# Patient Record
Sex: FEMALE | Race: BLACK OR AFRICAN AMERICAN | NOT HISPANIC OR LATINO | URBAN - METROPOLITAN AREA
[De-identification: names, ages, dates, MRNs, and addresses within clinical notes are randomized per-mention and may not be internally consistent; named-entity substitution may affect disease eponyms.]

---

## 2020-03-19 ENCOUNTER — INPATIENT (INPATIENT)
Facility: HOSPITAL | Age: 50
LOS: 14 days | Discharge: NURSING, REHAB OR LONG-TERM CARE | End: 2020-04-03
Attending: INTERNAL MEDICINE | Admitting: INTERNAL MEDICINE
Payer: MEDICARE

## 2020-03-19 VITALS
HEART RATE: 100 BPM | SYSTOLIC BLOOD PRESSURE: 92 MMHG | DIASTOLIC BLOOD PRESSURE: 39 MMHG | OXYGEN SATURATION: 97 % | TEMPERATURE: 100 F | RESPIRATION RATE: 18 BRPM

## 2020-03-19 DIAGNOSIS — R55 SYNCOPE AND COLLAPSE: ICD-10-CM

## 2020-03-19 DIAGNOSIS — B34.9 VIRAL INFECTION, UNSPECIFIED: ICD-10-CM

## 2020-03-19 DIAGNOSIS — E78.00 PURE HYPERCHOLESTEROLEMIA, UNSPECIFIED: ICD-10-CM

## 2020-03-19 DIAGNOSIS — E03.9 HYPOTHYROIDISM, UNSPECIFIED: ICD-10-CM

## 2020-03-19 DIAGNOSIS — Z02.9 ENCOUNTER FOR ADMINISTRATIVE EXAMINATIONS, UNSPECIFIED: ICD-10-CM

## 2020-03-19 DIAGNOSIS — I10 ESSENTIAL (PRIMARY) HYPERTENSION: ICD-10-CM

## 2020-03-19 DIAGNOSIS — Z29.9 ENCOUNTER FOR PROPHYLACTIC MEASURES, UNSPECIFIED: ICD-10-CM

## 2020-03-19 DIAGNOSIS — E11.9 TYPE 2 DIABETES MELLITUS WITHOUT COMPLICATIONS: ICD-10-CM

## 2020-03-19 DIAGNOSIS — Q90.9 DOWN SYNDROME, UNSPECIFIED: ICD-10-CM

## 2020-03-19 LAB
ALBUMIN SERPL ELPH-MCNC: 3.8 G/DL — SIGNIFICANT CHANGE UP (ref 3.3–5)
ALP SERPL-CCNC: 92 U/L — SIGNIFICANT CHANGE UP (ref 40–120)
ALT FLD-CCNC: 27 U/L — SIGNIFICANT CHANGE UP (ref 4–33)
ANION GAP SERPL CALC-SCNC: 13 MMO/L — SIGNIFICANT CHANGE UP (ref 7–14)
APPEARANCE UR: CLEAR — SIGNIFICANT CHANGE UP
APTT BLD: 34 SEC — SIGNIFICANT CHANGE UP (ref 27.5–36.3)
AST SERPL-CCNC: 34 U/L — HIGH (ref 4–32)
B PERT DNA SPEC QL NAA+PROBE: NOT DETECTED — SIGNIFICANT CHANGE UP
BACTERIA # UR AUTO: NEGATIVE — SIGNIFICANT CHANGE UP
BASE EXCESS BLDV CALC-SCNC: 1.2 MMOL/L — SIGNIFICANT CHANGE UP
BASOPHILS # BLD AUTO: 0.01 K/UL — SIGNIFICANT CHANGE UP (ref 0–0.2)
BASOPHILS NFR BLD AUTO: 0.2 % — SIGNIFICANT CHANGE UP (ref 0–2)
BILIRUB SERPL-MCNC: < 0.2 MG/DL — LOW (ref 0.2–1.2)
BILIRUB UR-MCNC: NEGATIVE — SIGNIFICANT CHANGE UP
BLOOD GAS VENOUS - CREATININE: 1.13 MG/DL — SIGNIFICANT CHANGE UP (ref 0.5–1.3)
BLOOD UR QL VISUAL: SIGNIFICANT CHANGE UP
BUN SERPL-MCNC: 16 MG/DL — SIGNIFICANT CHANGE UP (ref 7–23)
C PNEUM DNA SPEC QL NAA+PROBE: NOT DETECTED — SIGNIFICANT CHANGE UP
CALCIUM SERPL-MCNC: 8.3 MG/DL — LOW (ref 8.4–10.5)
CHLORIDE BLDV-SCNC: 105 MMOL/L — SIGNIFICANT CHANGE UP (ref 96–108)
CHLORIDE SERPL-SCNC: 103 MMOL/L — SIGNIFICANT CHANGE UP (ref 98–107)
CO2 SERPL-SCNC: 22 MMOL/L — SIGNIFICANT CHANGE UP (ref 22–31)
COLOR SPEC: YELLOW — SIGNIFICANT CHANGE UP
CREAT SERPL-MCNC: 1.2 MG/DL — SIGNIFICANT CHANGE UP (ref 0.5–1.3)
EOSINOPHIL # BLD AUTO: 0 K/UL — SIGNIFICANT CHANGE UP (ref 0–0.5)
EOSINOPHIL NFR BLD AUTO: 0 % — SIGNIFICANT CHANGE UP (ref 0–6)
FLUAV H1 2009 PAND RNA SPEC QL NAA+PROBE: NOT DETECTED — SIGNIFICANT CHANGE UP
FLUAV H1 RNA SPEC QL NAA+PROBE: NOT DETECTED — SIGNIFICANT CHANGE UP
FLUAV H3 RNA SPEC QL NAA+PROBE: NOT DETECTED — SIGNIFICANT CHANGE UP
FLUAV SUBTYP SPEC NAA+PROBE: NOT DETECTED — SIGNIFICANT CHANGE UP
FLUBV RNA SPEC QL NAA+PROBE: NOT DETECTED — SIGNIFICANT CHANGE UP
GAS PNL BLDV: 137 MMOL/L — SIGNIFICANT CHANGE UP (ref 136–146)
GLUCOSE BLDV-MCNC: 147 MG/DL — HIGH (ref 70–99)
GLUCOSE SERPL-MCNC: 151 MG/DL — HIGH (ref 70–99)
GLUCOSE UR-MCNC: NEGATIVE — SIGNIFICANT CHANGE UP
HADV DNA SPEC QL NAA+PROBE: NOT DETECTED — SIGNIFICANT CHANGE UP
HCG SERPL-ACNC: < 5 MIU/ML — SIGNIFICANT CHANGE UP
HCO3 BLDV-SCNC: 24 MMOL/L — SIGNIFICANT CHANGE UP (ref 20–27)
HCOV PNL SPEC NAA+PROBE: SIGNIFICANT CHANGE UP
HCT VFR BLD CALC: 39.9 % — SIGNIFICANT CHANGE UP (ref 34.5–45)
HCT VFR BLDV CALC: 42.1 % — SIGNIFICANT CHANGE UP (ref 34.5–45)
HGB BLD-MCNC: 13.4 G/DL — SIGNIFICANT CHANGE UP (ref 11.5–15.5)
HGB BLDV-MCNC: 13.7 G/DL — SIGNIFICANT CHANGE UP (ref 11.5–15.5)
HMPV RNA SPEC QL NAA+PROBE: NOT DETECTED — SIGNIFICANT CHANGE UP
HPIV1 RNA SPEC QL NAA+PROBE: NOT DETECTED — SIGNIFICANT CHANGE UP
HPIV2 RNA SPEC QL NAA+PROBE: NOT DETECTED — SIGNIFICANT CHANGE UP
HPIV3 RNA SPEC QL NAA+PROBE: NOT DETECTED — SIGNIFICANT CHANGE UP
HPIV4 RNA SPEC QL NAA+PROBE: NOT DETECTED — SIGNIFICANT CHANGE UP
HYALINE CASTS # UR AUTO: SIGNIFICANT CHANGE UP
IMM GRANULOCYTES NFR BLD AUTO: 0.8 % — SIGNIFICANT CHANGE UP (ref 0–1.5)
INR BLD: 1 — SIGNIFICANT CHANGE UP (ref 0.88–1.17)
KETONES UR-MCNC: NEGATIVE — SIGNIFICANT CHANGE UP
LACTATE BLDV-MCNC: 1.3 MMOL/L — SIGNIFICANT CHANGE UP (ref 0.5–2)
LEUKOCYTE ESTERASE UR-ACNC: SIGNIFICANT CHANGE UP
LYMPHOCYTES # BLD AUTO: 0.78 K/UL — LOW (ref 1–3.3)
LYMPHOCYTES # BLD AUTO: 15.4 % — SIGNIFICANT CHANGE UP (ref 13–44)
MCHC RBC-ENTMCNC: 30.9 PG — SIGNIFICANT CHANGE UP (ref 27–34)
MCHC RBC-ENTMCNC: 33.6 % — SIGNIFICANT CHANGE UP (ref 32–36)
MCV RBC AUTO: 92.1 FL — SIGNIFICANT CHANGE UP (ref 80–100)
MONOCYTES # BLD AUTO: 0.45 K/UL — SIGNIFICANT CHANGE UP (ref 0–0.9)
MONOCYTES NFR BLD AUTO: 8.9 % — SIGNIFICANT CHANGE UP (ref 2–14)
NEUTROPHILS # BLD AUTO: 3.8 K/UL — SIGNIFICANT CHANGE UP (ref 1.8–7.4)
NEUTROPHILS NFR BLD AUTO: 74.7 % — SIGNIFICANT CHANGE UP (ref 43–77)
NITRITE UR-MCNC: NEGATIVE — SIGNIFICANT CHANGE UP
NRBC # FLD: 0 K/UL — SIGNIFICANT CHANGE UP (ref 0–0)
PCO2 BLDV: 46 MMHG — SIGNIFICANT CHANGE UP (ref 41–51)
PH BLDV: 7.37 PH — SIGNIFICANT CHANGE UP (ref 7.32–7.43)
PH UR: 6.5 — SIGNIFICANT CHANGE UP (ref 5–8)
PLATELET # BLD AUTO: 188 K/UL — SIGNIFICANT CHANGE UP (ref 150–400)
PMV BLD: 9.5 FL — SIGNIFICANT CHANGE UP (ref 7–13)
PO2 BLDV: 35 MMHG — SIGNIFICANT CHANGE UP (ref 35–40)
POTASSIUM BLDV-SCNC: 4.5 MMOL/L — SIGNIFICANT CHANGE UP (ref 3.4–4.5)
POTASSIUM SERPL-MCNC: 4.8 MMOL/L — SIGNIFICANT CHANGE UP (ref 3.5–5.3)
POTASSIUM SERPL-SCNC: 4.8 MMOL/L — SIGNIFICANT CHANGE UP (ref 3.5–5.3)
PROT SERPL-MCNC: 7 G/DL — SIGNIFICANT CHANGE UP (ref 6–8.3)
PROT UR-MCNC: 30 — SIGNIFICANT CHANGE UP
PROTHROM AB SERPL-ACNC: 11.4 SEC — SIGNIFICANT CHANGE UP (ref 9.8–13.1)
RBC # BLD: 4.33 M/UL — SIGNIFICANT CHANGE UP (ref 3.8–5.2)
RBC # FLD: 14.3 % — SIGNIFICANT CHANGE UP (ref 10.3–14.5)
RBC CASTS # UR COMP ASSIST: HIGH (ref 0–?)
RSV RNA SPEC QL NAA+PROBE: NOT DETECTED — SIGNIFICANT CHANGE UP
RV+EV RNA SPEC QL NAA+PROBE: NOT DETECTED — SIGNIFICANT CHANGE UP
SAO2 % BLDV: 61.3 % — SIGNIFICANT CHANGE UP (ref 60–85)
SODIUM SERPL-SCNC: 138 MMOL/L — SIGNIFICANT CHANGE UP (ref 135–145)
SP GR SPEC: 1.02 — SIGNIFICANT CHANGE UP (ref 1–1.04)
SQUAMOUS # UR AUTO: SIGNIFICANT CHANGE UP
UROBILINOGEN FLD QL: NORMAL — SIGNIFICANT CHANGE UP
WBC # BLD: 5.08 K/UL — SIGNIFICANT CHANGE UP (ref 3.8–10.5)
WBC # FLD AUTO: 5.08 K/UL — SIGNIFICANT CHANGE UP (ref 3.8–10.5)
WBC UR QL: SIGNIFICANT CHANGE UP (ref 0–?)

## 2020-03-19 PROCEDURE — 99223 1ST HOSP IP/OBS HIGH 75: CPT

## 2020-03-19 PROCEDURE — 71045 X-RAY EXAM CHEST 1 VIEW: CPT | Mod: 26

## 2020-03-19 RX ORDER — INSULIN LISPRO 100/ML
VIAL (ML) SUBCUTANEOUS
Refills: 0 | Status: DISCONTINUED | OUTPATIENT
Start: 2020-03-19 | End: 2020-04-03

## 2020-03-19 RX ORDER — LEVOTHYROXINE SODIUM 125 MCG
1 TABLET ORAL
Qty: 0 | Refills: 0 | DISCHARGE

## 2020-03-19 RX ORDER — DEXTROSE 50 % IN WATER 50 %
12.5 SYRINGE (ML) INTRAVENOUS ONCE
Refills: 0 | Status: DISCONTINUED | OUTPATIENT
Start: 2020-03-19 | End: 2020-04-03

## 2020-03-19 RX ORDER — DEXTROSE 50 % IN WATER 50 %
25 SYRINGE (ML) INTRAVENOUS ONCE
Refills: 0 | Status: DISCONTINUED | OUTPATIENT
Start: 2020-03-19 | End: 2020-04-03

## 2020-03-19 RX ORDER — SODIUM CHLORIDE 9 MG/ML
1000 INJECTION, SOLUTION INTRAVENOUS
Refills: 0 | Status: DISCONTINUED | OUTPATIENT
Start: 2020-03-19 | End: 2020-04-03

## 2020-03-19 RX ORDER — ACETAMINOPHEN 500 MG
650 TABLET ORAL ONCE
Refills: 0 | Status: COMPLETED | OUTPATIENT
Start: 2020-03-19 | End: 2020-03-19

## 2020-03-19 RX ORDER — DEXTROSE 50 % IN WATER 50 %
15 SYRINGE (ML) INTRAVENOUS ONCE
Refills: 0 | Status: DISCONTINUED | OUTPATIENT
Start: 2020-03-19 | End: 2020-04-03

## 2020-03-19 RX ORDER — LEVOTHYROXINE SODIUM 125 MCG
25 TABLET ORAL DAILY
Refills: 0 | Status: DISCONTINUED | OUTPATIENT
Start: 2020-03-19 | End: 2020-04-03

## 2020-03-19 RX ORDER — HEPARIN SODIUM 5000 [USP'U]/ML
5000 INJECTION INTRAVENOUS; SUBCUTANEOUS THREE TIMES A DAY
Refills: 0 | Status: DISCONTINUED | OUTPATIENT
Start: 2020-03-19 | End: 2020-04-03

## 2020-03-19 RX ORDER — PANTOPRAZOLE SODIUM 20 MG/1
40 TABLET, DELAYED RELEASE ORAL
Refills: 0 | Status: DISCONTINUED | OUTPATIENT
Start: 2020-03-19 | End: 2020-04-03

## 2020-03-19 RX ORDER — METFORMIN HYDROCHLORIDE 850 MG/1
1 TABLET ORAL
Qty: 0 | Refills: 0 | DISCHARGE

## 2020-03-19 RX ORDER — INSULIN LISPRO 100/ML
VIAL (ML) SUBCUTANEOUS AT BEDTIME
Refills: 0 | Status: DISCONTINUED | OUTPATIENT
Start: 2020-03-19 | End: 2020-04-03

## 2020-03-19 RX ORDER — OMEPRAZOLE 10 MG/1
1 CAPSULE, DELAYED RELEASE ORAL
Qty: 0 | Refills: 0 | DISCHARGE

## 2020-03-19 RX ORDER — GLUCAGON INJECTION, SOLUTION 0.5 MG/.1ML
1 INJECTION, SOLUTION SUBCUTANEOUS ONCE
Refills: 0 | Status: DISCONTINUED | OUTPATIENT
Start: 2020-03-19 | End: 2020-04-03

## 2020-03-19 RX ADMIN — Medication 650 MILLIGRAM(S): at 20:54

## 2020-03-19 NOTE — H&P ADULT - PROBLEM SELECTOR PLAN 6
- not on home medication  - monitor BP   - DASH/TLC diet - TSH AM   - continue home medications  - monitor - TSH AM   - Continue levothyroxine

## 2020-03-19 NOTE — H&P ADULT - PROBLEM SELECTOR PLAN 2
- r/o COVID-19 viral syndrome  - Chest Xray: impression: clear lungs  - Full RVP Panel   neg  - continue O2 NC, may use NRB  - AVOID HFNC / Bipap /Nebulizers  - Contact, airborne, droplet isolation precautions  - Monitor respiratory status closely  - Patient is at a High / Low risk of decompensation at this time. - r/o COVID-19 viral syndrome  - Chest Xray: impression: clear lungs  - CT chest ordered  - will hold of abx for CAP at this time , will follow up CT chest  - Full RVP Panel   neg  - continue O2 NC, may use NRB  - AVOID HFNC / Bipap /Nebulizers  - Contact, airborne, droplet isolation precautions  - Monitor respiratory status closely  - Patient is at a Low risk of decompensation at this time. Pt with unexplained fever here.  - R/o COVID-19 viral syndrome  - Chest Xray: impression: clear lungs  - CT chest ordered  - Will hold off on abx for CAP at this time, will follow up CT chest  - Full RVP negative  - Continue O2 NC, may use NRB.   - AVOID HFNC/Bipap/Nebulizers  - Contact, airborne, droplet isolation precautions  - Monitor respiratory status closely

## 2020-03-19 NOTE — ED ADULT NURSE NOTE - OBJECTIVE STATEMENT
Patient received to room 5, non-verbal, respirations even and unlabored, skin intact, skin warm and dry good for color, calm and cooperative, guardian at bedside (Sha Nicole tel [phone]/cell [phone]). As per guardian, patient had two syncopal episodes, one yesterday and once today, aid denies falls. As per guardian, patient is currently at baseline. Left Ac 20g IV placed, labs drawn and sent, sinus rhythm on cardiac monitor. Pending provider assessment. Hx down syndrome.

## 2020-03-19 NOTE — ED ADULT NURSE NOTE - ED STAT RN HANDOFF DETAILS
rpt given to 5 Cecil RN pt VSS pt is at baseline. pt remains on airborne and contact precautions. pt placed in for transport. Will continue to monitor the pt

## 2020-03-19 NOTE — ED ADULT TRIAGE NOTE - CHIEF COMPLAINT QUOTE
p/t with hx Down syndrome, brought for syncopal episode yesterday and this am, p/t baseline nonverbal nad noted

## 2020-03-19 NOTE — H&P ADULT - NSICDXPASTMEDICALHX_GEN_ALL_CORE_FT
PAST MEDICAL HISTORY:  Down syndrome     High cholesterol     Hypertension PAST MEDICAL HISTORY:  Diabetes mellitus, type 2     Down syndrome     High cholesterol     Hypertension

## 2020-03-19 NOTE — ED PROVIDER NOTE - CLINICAL SUMMARY MEDICAL DECISION MAKING FREE TEXT BOX
syncope, low grade temp present. plan for ekg, trop, labs, CXR, u/a, RVP. Likely d/c home with quarantine precautions as long patient remains stable here in ED. syncope, low grade temp present. plan for ekg, trop, labs, CXR, u/a, RVP. Dispo depends on results and ability to quarantine at home.

## 2020-03-19 NOTE — H&P ADULT - NSHPSOCIALHISTORY_GEN_ALL_CORE
Tobacco Usage:  (x ) never smoked   ( ) former smoker  ( ) current smoker; Packs per day:   Alcohol Usage: (x ) none  ( ) occasional ( ) 2-3 times a week ( ) daily; Last drink:   Recreational drugs (x ) None  Lives with home with caregiver  Denies Recent travel Tobacco Usage:  (x ) never smoked   ( ) former smoker  ( ) current smoker; Packs per day:   Alcohol Usage: (x ) none  ( ) occasional ( ) 2-3 times a week ( ) daily; Last drink:   Recreational drugs (x ) None  Lives with home with caregiver

## 2020-03-19 NOTE — H&P ADULT - NSHPREVIEWOFSYSTEMS_GEN_ALL_CORE
Unable to obtain review of systems Pt is non verbal at baseline. Unable to obtain review of systems, as pt nonverbal. Pt is non verbal at baseline.

## 2020-03-19 NOTE — H&P ADULT - PROBLEM SELECTOR PLAN 4
- continue monitoring - Hold oral DM meds while inpatient   - A1C AM  - ISS  - monitor FS  - diabetic diet - Hold oral DM meds while inpatient   - A1C AM  - Start ISS and FS checks qAC and qhs  - monitor FS  - diabetic diet

## 2020-03-19 NOTE — H&P ADULT - ATTENDING COMMENTS
49F with history of Down's Syndrome, HTN, hyperlipidemia, Hypothyroidism, DM2 (on metformin) presents with syncope x2, possibly in setting of active infection. Temp to 100.3F, r/o COVID-19.

## 2020-03-19 NOTE — H&P ADULT - ASSESSMENT
Pt is being admitted for syncope work up and r/o COVID 19 48 y/o Female w/ PMHx of Down's Syndrome, HTN, hyperlipidemia, Hypothyroidism, DM2 ( on metformin) presents with Syncope x2. Pt is being admitted for syncope work up and r/o COVID 19 50 y/o Female w/ PMHx of Down's Syndrome, HTN, hyperlipidemia, Hypothyroidism, DM2 ( on metformin) presents with Syncope x2. Pt is being admitted for syncope work up and r/o COVID 19.

## 2020-03-19 NOTE — ED ADULT NURSE NOTE - NSIMPLEMENTINTERV_GEN_ALL_ED
Implemented All Universal Safety Interventions:  [place] to call system. Call bell, personal items and telephone within reach. Instruct patient to call for assistance. Room bathroom lighting operational. Non-slip footwear when patient is off stretcher. Physically safe environment: no spills, clutter or unnecessary equipment. Stretcher in lowest position, wheels locked, appropriate side rails in place.

## 2020-03-19 NOTE — H&P ADULT - PROBLEM SELECTOR PLAN 7
- lipid profile   - Low fat/ low cholesterol diet - not on home medication  - monitor BP   - DASH/TLC diet - Not on home medication  - Monitor BP   - DASH/TLC diet

## 2020-03-19 NOTE — H&P ADULT - PROBLEM SELECTOR PLAN 3
- Hold oral DM meds while inpatient   - A1C AM  - ISS  - monitor FS  - diabetic diet - Leuk Esterase: MODERATE   - Blood cultures and urine cultures sent  - will hold off on antibiotics   - monitor - Leuk Esterase: MODERATE   - Blood cultures and urine cultures sent  - will hold off on abx for now pending CT chest and culture results  - monitor

## 2020-03-19 NOTE — H&P ADULT - HISTORY OF PRESENT ILLNESS
In progress 50 y/o Female w/ PMHx of Down's Syndrome, HTN, hyperlipidemia, Hypothyroidism, DM2 ( on metformin) presents with Syncope x2. Patient is non-verbal and caretaker is providing all history. Spoke with care giver over the phone. She states post bowel movement pt passed out multiple times, no seizure activity noted by care giver and no head trauma.  Pt is non verbal and unable to gain review of systems. Per care giver pt lives with her and dines travel or sick contact. 48 y/o Female w/ PMHx of Down's Syndrome, HTN, hyperlipidemia, Hypothyroidism, DM2 ( on metformin) presents with Syncope x2. Patient is nonverbal and caretaker is providing all history. Spoke with caretaker over the phone. She states post bowel movement, pt passed out multiple times, no seizure activity noted by caretaker and no head trauma.  Pt is nonverbal and unable to obtain review of systems. Per caretaker, pt lives with her and defines travel or sick contacts.

## 2020-03-19 NOTE — H&P ADULT - PROBLEM SELECTOR PLAN 1
- TTE AM  - orthostatics   - monitor on tele   - fall precautions  - ambulate w/ assistance   - PT eliudal Unclear etiology, possibly in setting of active etiology, but can be orthostatic vs. vasovagal vs. neurogenic vs. cardiogenic.  - TTE ordered  - Check orthostatics   - Monitor on tele for arrhythmias  - Fall precautions  - Ambulate w/ assistance     - CT head and neck, Xrays negative

## 2020-03-19 NOTE — H&P ADULT - PROBLEM SELECTOR PLAN 5
- TSH AM   - continue home medications  - monitor - continue monitoring - Aspiration precautions  - Fall risk protocol

## 2020-03-19 NOTE — ED ADULT NURSE REASSESSMENT NOTE - NS ED NURSE REASSESS COMMENT FT1
Received report from day shift RN Abigail. Received Pt in room 5. pt nonverbal unable to answer questions, pt with down syndrome. Appears to be comfortable, stable and in no apparent distress. pt turned, cleaned and positioned. No complaints noted. Vitals stable as noted. 20 gauge iv noted to the left ac. Pt medicated as per orders. respirations are equal and nonlabored, no respiratory distress noted. sating 100% on room air. Pt stable, will reassess

## 2020-03-19 NOTE — H&P ADULT - NSHPLABSRESULTS_GEN_ALL_CORE
LABORATORY VALUES	 	                          13.4   5.08  )-----------( 188      ( 19 Mar 2020 11:37 )             39.9           138  |  103  |  16  ----------------------------<  151<H>  4.8   |  22  |  1.20    Ca    8.3<L>      19 Mar 2020 11:37    TPro  7.0  /  Alb  3.8  /  TBili  < 0.2<L>  /  DBili  x   /  AST  34<H>  /  ALT  27  /  AlkPhos  92      LIVER FUNCTIONS - ( 19 Mar 2020 11:37 )  Alb: 3.8 g/dL / Pro: 7.0 g/dL / ALK PHOS: 92 u/L / ALT: 27 u/L / AST: 34 u/L / GGT: x           Prothrombin Time, Plasma: 11.4 SEC ( @ 11:37)      CARDIAC MARKERS:    Blood Gas Venous - Lactate: 1.3 mmol/L ( @ 11:37)    Urinalysis Basic - ( 19 Mar 2020 17:15 )    Color: YELLOW / Appearance: CLEAR / S.025 / pH: 6.5  Gluc: NEGATIVE / Ketone: NEGATIVE  / Bili: NEGATIVE / Urobili: NORMAL   Blood: TRACE / Protein: 30 / Nitrite: NEGATIVE   Leuk Esterase: MODERATE / RBC: 6-10 / WBC 0-2   Sq Epi: FEW / Non Sq Epi: x / Bacteria: NEGATIVE      CAPILLARY BLOOD GLUCOSE     @ 13:30  229E Corona Virus --  Adenovirus Not Detected  Bordetella pertussis --  Chlamydia pneumoniae Not Detected  Entero/Rhino Virus Not Detected  HKU1 Coronavirus --  hMPV Not Detected  Influenza A Not Detected  Influenza AH1 Not Detected  Influenza AH1  Not Detected  Influenza AH3 Not Detected  Influenza B Not Detected  Mycoplasma pneumoniae Not Detected  NL63 Coronavirus --  OC43 Corornavirus --  Parainfluenza 1 Not Detected  Parainfluenza 2 Not Detected    Chest Xray :  IMPRESSION: No acute process. LABORATORY VALUES	 	                        13.4   5.08  )-----------( 188      ( 19 Mar 2020 11:37 )             39.9           138  |  103  |  16  ----------------------------<  151<H>  4.8   |  22  |  1.20    Ca    8.3<L>      19 Mar 2020 11:37    TPro  7.0  /  Alb  3.8  /  TBili  < 0.2<L>  /  DBili  x   /  AST  34<H>  /  ALT  27  /  AlkPhos  92      LIVER FUNCTIONS - ( 19 Mar 2020 11:37 )  Alb: 3.8 g/dL / Pro: 7.0 g/dL / ALK PHOS: 92 u/L / ALT: 27 u/L / AST: 34 u/L / GGT: x           Prothrombin Time, Plasma: 11.4 SEC ( @ 11:37)    CARDIAC MARKERS:    Blood Gas Venous - Lactate: 1.3 mmol/L ( @ 11:37)    Urinalysis Basic - ( 19 Mar 2020 17:15 )    Color: YELLOW / Appearance: CLEAR / S.025 / pH: 6.5  Gluc: NEGATIVE / Ketone: NEGATIVE  / Bili: NEGATIVE / Urobili: NORMAL   Blood: TRACE / Protein: 30 / Nitrite: NEGATIVE   Leuk Esterase: MODERATE / RBC: 6-10 / WBC 0-2   Sq Epi: FEW / Non Sq Epi: x / Bacteria: NEGATIVE    CAPILLARY BLOOD GLUCOSE     @ 13:30  229E Corona Virus --  Adenovirus Not Detected  Bordetella pertussis --  Chlamydia pneumoniae Not Detected  Entero/Rhino Virus Not Detected  HKU1 Coronavirus --  hMPV Not Detected  Influenza A Not Detected  Influenza AH1 Not Detected  Influenza AH1  Not Detected  Influenza AH3 Not Detected  Influenza B Not Detected  Mycoplasma pneumoniae Not Detected  NL63 Coronavirus --  OC43 Corornavirus --  Parainfluenza 1 Not Detected  Parainfluenza 2 Not Detected    Chest Xray :  IMPRESSION: No acute process.

## 2020-03-19 NOTE — H&P ADULT - NSHPPHYSICALEXAM_GEN_ALL_CORE
T(C): 37.9 (03-19-20 @ 19:24), Max: 37.9 (03-19-20 @ 19:24)  HR: 88 (03-19-20 @ 19:24) (85 - 100)  BP: 115/66 (03-19-20 @ 19:24) (92/39 - 115/66)  RR: 16 (03-19-20 @ 19:24) (16 - 18)  SpO2: 99% (03-19-20 @ 19:24) (95% - 99%)    Constitutional: NAD, well-developed, well-nourished  Ears, Nose, Mouth, and Throat: normal external ears and nose, normal hearing, moist oral mucosa  Eyes: normal conjunctiva, EOMI, PERRL  Neck: supple, no JVD  Respiratory: Clear to auscultation bilaterally. No wheezes, rales or rhonchi. Normal respiratory effort  Cardiovascular: RRR, no M/R/G, no edema, 2+ Peripheral Pulses  Gastrointestinal: soft, nontender, nondistended, +BS, no hernia  Skin: warm, dry, no rash  Neurologic: sensation grossly intact, CN grossly intact, non-focal exam  Musculoskeletal: no clubbing, no cyanosis, no joint swelling  Psychiatric: AOX3, appropriate mood, affect T(C): 37.9 (03-19-20 @ 19:24), Max: 37.9 (03-19-20 @ 19:24)  HR: 88 (03-19-20 @ 19:24) (85 - 100)  BP: 115/66 (03-19-20 @ 19:24) (92/39 - 115/66)  RR: 16 (03-19-20 @ 19:24) (16 - 18)  SpO2: 99% (03-19-20 @ 19:24) (95% - 99%)    Physical Exam limited given pt's inability to understand and follow directions.   Constitutional: Awake and alert, NAD  Head: Normocephalic, atraumatic  Eyes: Normal conjunctiva, EOMI  Mouth: Omitted  Neck: Supple, no JVD  Respiratory: Clear to auscultation bilaterally. No wheezes, rales or rhonchi. Normal respiratory effort.  Cardiovascular: RRR, normal S1 and S2, no M/R/G, no LE edema b/l, 2+ Peripheral Pulses  Gastrointestinal: Soft, nontender, nondistended, +BS, no hernia  Skin: Warm, dry, no rashes  Neurologic: Nonverbal, limited exam  Musculoskeletal: No cyanosis, no paraspinal or spinal tenderness

## 2020-03-19 NOTE — ED PROVIDER NOTE - PROGRESS NOTE DETAILS
Attending MD Baltazar.  Pt signed out to me in hemodynamically stable condition pending urine and dispo decision, pt lives in a home with care taker, has down's syndrome, presented today because of 2 episodes post-micturitional syncope, no travel/sick contacts, no cough/congestion noted, planned 14 day quarantine for her and housemates if discharged, caretaker aware. Attending MD Baltazar.  Ct Amado has down syndrome, fever and syncope today.  Concern for poss cardiac etiology of her syncope 2/2 down syndrome risk, also poss viral cardiomyopathy.  Pt warrants admission for echocardiogram.  Currently pt's presentation does not warrant admission for concern for covid/severity of viral syndrome.  She does however warrant echocardiogram prior to discharge.  Will send covid testing for in-hospital cohorting.  Stable for admission to medicine.

## 2020-03-19 NOTE — ED PROVIDER NOTE - OBJECTIVE STATEMENT
Daksha GUZMÁN: Patient is a 50 yo F with history of Down's Syndrome, HTN, hyperlipidemia brought in for episode of syncope x 2, both while on toilet during straining. One episode occurred yesterday and one today. No prior episodes. Patient is a [facility] of the [facility] but resides in a facility backed home with caretaker present. He denies travel, sick contacts, prior episodes. Patient is non-verbal and caretaker is providing all history. No recent hospitalizations.

## 2020-03-20 DIAGNOSIS — N39.0 URINARY TRACT INFECTION, SITE NOT SPECIFIED: ICD-10-CM

## 2020-03-20 LAB
ANION GAP SERPL CALC-SCNC: 12 MMO/L — SIGNIFICANT CHANGE UP (ref 7–14)
BUN SERPL-MCNC: 15 MG/DL — SIGNIFICANT CHANGE UP (ref 7–23)
CALCIUM SERPL-MCNC: 8.2 MG/DL — LOW (ref 8.4–10.5)
CHLORIDE SERPL-SCNC: 101 MMOL/L — SIGNIFICANT CHANGE UP (ref 98–107)
CHOLEST SERPL-MCNC: 140 MG/DL — SIGNIFICANT CHANGE UP (ref 120–199)
CO2 SERPL-SCNC: 24 MMOL/L — SIGNIFICANT CHANGE UP (ref 22–31)
CREAT SERPL-MCNC: 1 MG/DL — SIGNIFICANT CHANGE UP (ref 0.5–1.3)
CULTURE RESULTS: SIGNIFICANT CHANGE UP
GLUCOSE SERPL-MCNC: 128 MG/DL — HIGH (ref 70–99)
HBA1C BLD-MCNC: 6.7 % — HIGH (ref 4–5.6)
HCT VFR BLD CALC: 41.9 % — SIGNIFICANT CHANGE UP (ref 34.5–45)
HDLC SERPL-MCNC: 47 MG/DL — SIGNIFICANT CHANGE UP (ref 45–65)
HGB BLD-MCNC: 13.5 G/DL — SIGNIFICANT CHANGE UP (ref 11.5–15.5)
LIPID PNL WITH DIRECT LDL SERPL: 83 MG/DL — SIGNIFICANT CHANGE UP
MAGNESIUM SERPL-MCNC: 2.1 MG/DL — SIGNIFICANT CHANGE UP (ref 1.6–2.6)
MCHC RBC-ENTMCNC: 30 PG — SIGNIFICANT CHANGE UP (ref 27–34)
MCHC RBC-ENTMCNC: 32.2 % — SIGNIFICANT CHANGE UP (ref 32–36)
MCV RBC AUTO: 93.1 FL — SIGNIFICANT CHANGE UP (ref 80–100)
NRBC # FLD: 0 K/UL — SIGNIFICANT CHANGE UP (ref 0–0)
PHOSPHATE SERPL-MCNC: 3.6 MG/DL — SIGNIFICANT CHANGE UP (ref 2.5–4.5)
PLATELET # BLD AUTO: 168 K/UL — SIGNIFICANT CHANGE UP (ref 150–400)
PMV BLD: 9 FL — SIGNIFICANT CHANGE UP (ref 7–13)
POTASSIUM SERPL-MCNC: 4.5 MMOL/L — SIGNIFICANT CHANGE UP (ref 3.5–5.3)
POTASSIUM SERPL-SCNC: 4.5 MMOL/L — SIGNIFICANT CHANGE UP (ref 3.5–5.3)
RBC # BLD: 4.5 M/UL — SIGNIFICANT CHANGE UP (ref 3.8–5.2)
RBC # FLD: 14.4 % — SIGNIFICANT CHANGE UP (ref 10.3–14.5)
SODIUM SERPL-SCNC: 137 MMOL/L — SIGNIFICANT CHANGE UP (ref 135–145)
SPECIMEN SOURCE: SIGNIFICANT CHANGE UP
TRIGL SERPL-MCNC: 129 MG/DL — SIGNIFICANT CHANGE UP (ref 10–149)
TSH SERPL-MCNC: 5.75 UIU/ML — HIGH (ref 0.27–4.2)
WBC # BLD: 3.77 K/UL — LOW (ref 3.8–10.5)
WBC # FLD AUTO: 3.77 K/UL — LOW (ref 3.8–10.5)

## 2020-03-20 PROCEDURE — 99233 SBSQ HOSP IP/OBS HIGH 50: CPT

## 2020-03-20 RX ORDER — SODIUM CHLORIDE 9 MG/ML
1000 INJECTION, SOLUTION INTRAVENOUS
Refills: 0 | Status: COMPLETED | OUTPATIENT
Start: 2020-03-20 | End: 2020-03-20

## 2020-03-20 RX ORDER — ACETAMINOPHEN 500 MG
650 TABLET ORAL EVERY 6 HOURS
Refills: 0 | Status: DISCONTINUED | OUTPATIENT
Start: 2020-03-20 | End: 2020-04-03

## 2020-03-20 RX ADMIN — SODIUM CHLORIDE 75 MILLILITER(S): 9 INJECTION, SOLUTION INTRAVENOUS at 16:09

## 2020-03-20 RX ADMIN — Medication 25 MICROGRAM(S): at 05:02

## 2020-03-20 RX ADMIN — PANTOPRAZOLE SODIUM 40 MILLIGRAM(S): 20 TABLET, DELAYED RELEASE ORAL at 05:02

## 2020-03-20 RX ADMIN — HEPARIN SODIUM 5000 UNIT(S): 5000 INJECTION INTRAVENOUS; SUBCUTANEOUS at 14:34

## 2020-03-20 RX ADMIN — Medication 1: at 12:40

## 2020-03-20 NOTE — PROGRESS NOTE ADULT - SUBJECTIVE AND OBJECTIVE BOX
Patient is a 49y old  Female who presents with a chief complaint of Syncope (19 Mar 2020 21:33)    SUBJECTIVE / OVERNIGHT EVENTS: Patient is non verbal and unable to provide history. Appears well. Not in distress. Eating lunch.     MEDICATIONS  (STANDING):  dextrose 5%. 1000 milliLiter(s) (50 mL/Hr) IV Continuous <Continuous>  dextrose 50% Injectable 12.5 Gram(s) IV Push once  dextrose 50% Injectable 25 Gram(s) IV Push once  dextrose 50% Injectable 25 Gram(s) IV Push once  heparin  Injectable 5000 Unit(s) SubCutaneous three times a day  insulin lispro (HumaLOG) corrective regimen sliding scale   SubCutaneous three times a day before meals  insulin lispro (HumaLOG) corrective regimen sliding scale   SubCutaneous at bedtime  levothyroxine 25 MICROGram(s) Oral daily  pantoprazole    Tablet 40 milliGRAM(s) Oral before breakfast    MEDICATIONS  (PRN):  acetaminophen   Tablet .. 650 milliGRAM(s) Oral every 6 hours PRN Temp greater or equal to 38C (100.4F)  dextrose 40% Gel 15 Gram(s) Oral once PRN Blood Glucose LESS THAN 70 milliGRAM(s)/deciliter  glucagon  Injectable 1 milliGRAM(s) IntraMuscular once PRN Glucose LESS THAN 70 milligrams/deciliter      Vital Signs Last 24 Hrs  T(C): 37.5 (20 Mar 2020 12:20), Max: 37.9 (19 Mar 2020 19:24)  T(F): 99.5 (20 Mar 2020 12:20), Max: 100.3 (19 Mar 2020 19:24)  HR: 93 (20 Mar 2020 12:20) (84 - 93)  BP: 110/51 (20 Mar 2020 12:20) (90/54 - 124/70)  BP(mean): --  RR: 17 (20 Mar 2020 12:20) (16 - 18)  SpO2: 100% (20 Mar 2020 12:20) (99% - 100%)    POCT Blood Glucose.: 190 mg/dL (20 Mar 2020 12:24)  POCT Blood Glucose.: 122 mg/dL (20 Mar 2020 08:38)    I&O's Summary    20 Mar 2020 07:01  -  20 Mar 2020 12:57  --------------------------------------------------------  IN: 120 mL / OUT: 0 mL / NET: 120 mL        PHYSICAL EXAM:  GENERAL: NAD, well-developed  HEAD:  Atraumatic, Normocephalic  EYES: EOMI, PERRLA, conjunctiva and sclera clear  NECK: Supple, No JVD  CHEST/LUNG: Clear to auscultation bilaterally; No wheeze  HEART: Regular rate and rhythm; No murmurs, rubs, or gallops  ABDOMEN: Soft, Nontender, Nondistended; Bowel sounds present  EXTREMITIES:  2+ Peripheral Pulses, No clubbing, cyanosis, or edema  PSYCH: awake  NEUROLOGY: non-focal  SKIN: No rashes or lesions    LABS:                        13.5   3.77  )-----------( 168      ( 20 Mar 2020 06:00 )             41.9     03-20    137  |  101  |  15  ----------------------------<  128<H>  4.5   |  24  |  1.00    Ca    8.2<L>      20 Mar 2020 06:00  Phos  3.6     03-20  Mg     2.1     03-20    TPro  7.0  /  Alb  3.8  /  TBili  < 0.2<L>  /  DBili  x   /  AST  34<H>  /  ALT  27  /  AlkPhos  92  03-19    PT/INR - ( 19 Mar 2020 11:37 )   PT: 11.4 SEC;   INR: 1.00          PTT - ( 19 Mar 2020 11:37 )  PTT:34.0 SEC      Urinalysis Basic - ( 19 Mar 2020 17:15 )    Color: YELLOW / Appearance: CLEAR / S.025 / pH: 6.5  Gluc: NEGATIVE / Ketone: NEGATIVE  / Bili: NEGATIVE / Urobili: NORMAL   Blood: TRACE / Protein: 30 / Nitrite: NEGATIVE   Leuk Esterase: MODERATE / RBC: 6-10 / WBC 0-2   Sq Epi: FEW / Non Sq Epi: x / Bacteria: NEGATIVE        RADIOLOGY & ADDITIONAL TESTS:     Imaging Personally Reviewed:CXR: clear lungs     Consultant(s) Notes Reviewed:      Care Discussed with Consultants/Other Providers:    Assessment and Plan:

## 2020-03-20 NOTE — PATIENT PROFILE ADULT - CAREGIVER OBTAIN DETAILS
Telephone Encounter by Peabody, Diane, RN at 17 12:02 PM     Author:  Peabody, Diane, RN Service:  (none) Author Type:  Registered Nurse     Filed:  17 12:02 PM Encounter Date:  2017 Status:  Signed     :  Peabody, Diane, RN (Registered Nurse)       From: Maura Simpson  To: Essence Novoa MD  Sent: 2017 11:14 AM CDT  Subject: After-Visit Questions    This message is for Bianca with lactation. I'm having some issues   breastfeeding my , and I was wondering if we could come in to get   some tips on a successful latch. I have to bring baby into the [facility]   office today at 2:15 for a weight check, so I was hoping you had something   available before/after that? Please let me know. Thanks! Maura       Revision History        Date/Time User Provider Type Action    > 17 12:02 PM Peabody, Diane, RN Registered Nurse Sign    Attribution information within the note text is not available.             NYDIA - patient nonverbal

## 2020-03-20 NOTE — PROGRESS NOTE ADULT - PROBLEM SELECTOR PLAN 1
-History of syncope post BM suggestive of vasovagal episode  -No further episodes where  -can be orthostatic vs. vasovagal and less likely neurogenic or cardiogenic.  -Check orthostatic  - TTE ordered  - Monitor on tele for arrhythmias  - Fall precautions  - Ambulate w/ assistance   - CT head and neck, Xrays negative -History of syncope post BM suggestive of vasovagal episode  -No further episodes where  -can be orthostatic vs. vasovagal and less likely neurogenic or cardiogenic.  -Check orthostatic  - TTE ordered  - Monitor on tele for arrhythmias  - Fall precautions  - Ambulate w/ assistance

## 2020-03-21 LAB
ANION GAP SERPL CALC-SCNC: 10 MMO/L — SIGNIFICANT CHANGE UP (ref 7–14)
BUN SERPL-MCNC: 16 MG/DL — SIGNIFICANT CHANGE UP (ref 7–23)
CALCIUM SERPL-MCNC: 8.1 MG/DL — LOW (ref 8.4–10.5)
CHLORIDE SERPL-SCNC: 101 MMOL/L — SIGNIFICANT CHANGE UP (ref 98–107)
CO2 SERPL-SCNC: 23 MMOL/L — SIGNIFICANT CHANGE UP (ref 22–31)
CREAT SERPL-MCNC: 0.99 MG/DL — SIGNIFICANT CHANGE UP (ref 0.5–1.3)
GLUCOSE SERPL-MCNC: 138 MG/DL — HIGH (ref 70–99)
HCT VFR BLD CALC: 39.8 % — SIGNIFICANT CHANGE UP (ref 34.5–45)
HGB BLD-MCNC: 13.1 G/DL — SIGNIFICANT CHANGE UP (ref 11.5–15.5)
MAGNESIUM SERPL-MCNC: 2 MG/DL — SIGNIFICANT CHANGE UP (ref 1.6–2.6)
MCHC RBC-ENTMCNC: 30.6 PG — SIGNIFICANT CHANGE UP (ref 27–34)
MCHC RBC-ENTMCNC: 32.9 % — SIGNIFICANT CHANGE UP (ref 32–36)
MCV RBC AUTO: 93 FL — SIGNIFICANT CHANGE UP (ref 80–100)
NRBC # FLD: 0 K/UL — SIGNIFICANT CHANGE UP (ref 0–0)
PHOSPHATE SERPL-MCNC: 3.1 MG/DL — SIGNIFICANT CHANGE UP (ref 2.5–4.5)
PLATELET # BLD AUTO: 152 K/UL — SIGNIFICANT CHANGE UP (ref 150–400)
PMV BLD: 9.2 FL — SIGNIFICANT CHANGE UP (ref 7–13)
POTASSIUM SERPL-MCNC: 4 MMOL/L — SIGNIFICANT CHANGE UP (ref 3.5–5.3)
POTASSIUM SERPL-SCNC: 4 MMOL/L — SIGNIFICANT CHANGE UP (ref 3.5–5.3)
RBC # BLD: 4.28 M/UL — SIGNIFICANT CHANGE UP (ref 3.8–5.2)
RBC # FLD: 14.2 % — SIGNIFICANT CHANGE UP (ref 10.3–14.5)
SARS-COV-2 RNA SPEC QL NAA+PROBE: DETECTED
SODIUM SERPL-SCNC: 134 MMOL/L — LOW (ref 135–145)
WBC # BLD: 3.27 K/UL — LOW (ref 3.8–10.5)
WBC # FLD AUTO: 3.27 K/UL — LOW (ref 3.8–10.5)

## 2020-03-21 PROCEDURE — 99233 SBSQ HOSP IP/OBS HIGH 50: CPT

## 2020-03-21 RX ADMIN — Medication 25 MICROGRAM(S): at 04:13

## 2020-03-21 RX ADMIN — HEPARIN SODIUM 5000 UNIT(S): 5000 INJECTION INTRAVENOUS; SUBCUTANEOUS at 04:13

## 2020-03-21 RX ADMIN — PANTOPRAZOLE SODIUM 40 MILLIGRAM(S): 20 TABLET, DELAYED RELEASE ORAL at 04:13

## 2020-03-21 RX ADMIN — Medication 1: at 12:37

## 2020-03-21 RX ADMIN — HEPARIN SODIUM 5000 UNIT(S): 5000 INJECTION INTRAVENOUS; SUBCUTANEOUS at 12:37

## 2020-03-21 NOTE — PROGRESS NOTE ADULT - PROBLEM SELECTOR PLAN 1
-History of syncope post BM suggestive of vasovagal episode  -No further episodes here. No tele events  -Orthostatics positive yesterday, s/p 1L IVF. Not orthostatic today. Eating well.   - Fall precautions  - Ambulate w/ assistance  -Awaiting covid results otherwise stable to DC back to group [place]

## 2020-03-21 NOTE — PROGRESS NOTE ADULT - SUBJECTIVE AND OBJECTIVE BOX
Patient is a 49y old  Female who presents with a chief complaint of Syncope (19 Mar 2020 21:33)    SUBJECTIVE / OVERNIGHT EVENTS: Patient is non verbal and unable to provide history. Appears well. Not in distress. Eating breakfast     MEDICATIONS  (STANDING):  dextrose 5%. 1000 milliLiter(s) (50 mL/Hr) IV Continuous <Continuous>  dextrose 50% Injectable 12.5 Gram(s) IV Push once  dextrose 50% Injectable 25 Gram(s) IV Push once  dextrose 50% Injectable 25 Gram(s) IV Push once  heparin  Injectable 5000 Unit(s) SubCutaneous three times a day  insulin lispro (HumaLOG) corrective regimen sliding scale   SubCutaneous three times a day before meals  insulin lispro (HumaLOG) corrective regimen sliding scale   SubCutaneous at bedtime  levothyroxine 25 MICROGram(s) Oral daily  pantoprazole    Tablet 40 milliGRAM(s) Oral before breakfast    MEDICATIONS  (PRN):  acetaminophen   Tablet .. 650 milliGRAM(s) Oral every 6 hours PRN Temp greater or equal to 38C (100.4F)  dextrose 40% Gel 15 Gram(s) Oral once PRN Blood Glucose LESS THAN 70 milliGRAM(s)/deciliter  glucagon  Injectable 1 milliGRAM(s) IntraMuscular once PRN Glucose LESS THAN 70 milligrams/deciliter      Vital Signs Last 24 Hrs  T(C): 37.2 (21 Mar 2020 11:22), Max: 37.8 (21 Mar 2020 00:00)  T(F): 98.9 (21 Mar 2020 11:22), Max: 100 (21 Mar 2020 00:00)  HR: 87 (21 Mar 2020 11:22) (81 - 110)  BP: 103/54 (21 Mar 2020 11:22) (96/52 - 120/86)  BP(mean): --  RR: 17 (21 Mar 2020 11:22) (16 - 18)  SpO2: 95% (21 Mar 2020 11:22) (95% - 100%)    CAPILLARY BLOOD GLUCOSE      POCT Blood Glucose.: 188 mg/dL (21 Mar 2020 12:32)  POCT Blood Glucose.: 136 mg/dL (21 Mar 2020 08:41)  POCT Blood Glucose.: 150 mg/dL (20 Mar 2020 21:45)  POCT Blood Glucose.: 127 mg/dL (20 Mar 2020 17:19)      I&O's Summary    20 Mar 2020 07:01  -  20 Mar 2020 12:57  --------------------------------------------------------  IN: 120 mL / OUT: 0 mL / NET: 120 mL        PHYSICAL EXAM:  GENERAL: NAD, well-developed  HEAD:  Atraumatic, Normocephalic  EYES: EOMI, PERRLA, conjunctiva and sclera clear  NECK: Supple, No JVD  CHEST/LUNG: Clear to auscultation bilaterally; No wheeze  HEART: Regular rate and rhythm; No murmurs, rubs, or gallops  ABDOMEN: Soft, Nontender, Nondistended; Bowel sounds present  EXTREMITIES:  2+ Peripheral Pulses, No clubbing, cyanosis, or edema  PSYCH: awake  NEUROLOGY: non-focal  SKIN: No rashes or lesions    LABS:                                   13.1   3.27  )-----------( 152      ( 21 Mar 2020 06:34 )             39.8   03-21    134<L>  |  101  |  16  ----------------------------<  138<H>  4.0   |  23  |  0.99    Ca    8.1<L>      21 Mar 2020 06:34  Phos  3.1     03-21  Mg     2.0     03-21          RADIOLOGY & ADDITIONAL TESTS:     Imaging Personally Reviewed:CXR: clear lungs     Consultant(s) Notes Reviewed:      Care Discussed with Consultants/Other Providers:    Assessment and Plan:

## 2020-03-22 LAB
ANION GAP SERPL CALC-SCNC: 10 MMO/L — SIGNIFICANT CHANGE UP (ref 7–14)
BUN SERPL-MCNC: 13 MG/DL — SIGNIFICANT CHANGE UP (ref 7–23)
CALCIUM SERPL-MCNC: 8.1 MG/DL — LOW (ref 8.4–10.5)
CHLORIDE SERPL-SCNC: 103 MMOL/L — SIGNIFICANT CHANGE UP (ref 98–107)
CO2 SERPL-SCNC: 23 MMOL/L — SIGNIFICANT CHANGE UP (ref 22–31)
CREAT SERPL-MCNC: 0.89 MG/DL — SIGNIFICANT CHANGE UP (ref 0.5–1.3)
GLUCOSE SERPL-MCNC: 129 MG/DL — HIGH (ref 70–99)
HCT VFR BLD CALC: 38.8 % — SIGNIFICANT CHANGE UP (ref 34.5–45)
HGB BLD-MCNC: 13.1 G/DL — SIGNIFICANT CHANGE UP (ref 11.5–15.5)
MAGNESIUM SERPL-MCNC: 2 MG/DL — SIGNIFICANT CHANGE UP (ref 1.6–2.6)
MCHC RBC-ENTMCNC: 30.8 PG — SIGNIFICANT CHANGE UP (ref 27–34)
MCHC RBC-ENTMCNC: 33.8 % — SIGNIFICANT CHANGE UP (ref 32–36)
MCV RBC AUTO: 91.3 FL — SIGNIFICANT CHANGE UP (ref 80–100)
NRBC # FLD: 0 K/UL — SIGNIFICANT CHANGE UP (ref 0–0)
PHOSPHATE SERPL-MCNC: 2.8 MG/DL — SIGNIFICANT CHANGE UP (ref 2.5–4.5)
PLATELET # BLD AUTO: 167 K/UL — SIGNIFICANT CHANGE UP (ref 150–400)
PMV BLD: 10 FL — SIGNIFICANT CHANGE UP (ref 7–13)
POTASSIUM SERPL-MCNC: 4.1 MMOL/L — SIGNIFICANT CHANGE UP (ref 3.5–5.3)
POTASSIUM SERPL-SCNC: 4.1 MMOL/L — SIGNIFICANT CHANGE UP (ref 3.5–5.3)
RBC # BLD: 4.25 M/UL — SIGNIFICANT CHANGE UP (ref 3.8–5.2)
RBC # FLD: 14.1 % — SIGNIFICANT CHANGE UP (ref 10.3–14.5)
SODIUM SERPL-SCNC: 136 MMOL/L — SIGNIFICANT CHANGE UP (ref 135–145)
WBC # BLD: 3.76 K/UL — LOW (ref 3.8–10.5)
WBC # FLD AUTO: 3.76 K/UL — LOW (ref 3.8–10.5)

## 2020-03-22 PROCEDURE — 99232 SBSQ HOSP IP/OBS MODERATE 35: CPT

## 2020-03-22 RX ADMIN — Medication 25 MICROGRAM(S): at 05:42

## 2020-03-22 RX ADMIN — Medication 650 MILLIGRAM(S): at 14:00

## 2020-03-22 RX ADMIN — Medication 650 MILLIGRAM(S): at 13:00

## 2020-03-22 RX ADMIN — PANTOPRAZOLE SODIUM 40 MILLIGRAM(S): 20 TABLET, DELAYED RELEASE ORAL at 05:42

## 2020-03-22 RX ADMIN — HEPARIN SODIUM 5000 UNIT(S): 5000 INJECTION INTRAVENOUS; SUBCUTANEOUS at 13:17

## 2020-03-22 NOTE — PROGRESS NOTE ADULT - PROBLEM SELECTOR PLAN 1
-History of syncope post BM suggestive of vasovagal episode  -No further episodes here. No tele events  -Orthostatics positive yesterday, s/p 1L IVF. Not orthostatic today. Eating well.   - Fall precautions  - Ambulate w/ assistance  -COVID +

## 2020-03-22 NOTE — PROGRESS NOTE ADULT - SUBJECTIVE AND OBJECTIVE BOX
Patient is a 49y old  Female who presents with a chief complaint of Syncope (19 Mar 2020 21:33)    SUBJECTIVE / OVERNIGHT EVENTS: Patient is non verbal and unable to provide history. Appears well. Not in distress. Sleeping comfortable.     MEDICATIONS  (STANDING):  dextrose 5%. 1000 milliLiter(s) (50 mL/Hr) IV Continuous <Continuous>  dextrose 50% Injectable 12.5 Gram(s) IV Push once  dextrose 50% Injectable 25 Gram(s) IV Push once  dextrose 50% Injectable 25 Gram(s) IV Push once  heparin  Injectable 5000 Unit(s) SubCutaneous three times a day  insulin lispro (HumaLOG) corrective regimen sliding scale   SubCutaneous three times a day before meals  insulin lispro (HumaLOG) corrective regimen sliding scale   SubCutaneous at bedtime  levothyroxine 25 MICROGram(s) Oral daily  pantoprazole    Tablet 40 milliGRAM(s) Oral before breakfast    MEDICATIONS  (PRN):  acetaminophen   Tablet .. 650 milliGRAM(s) Oral every 6 hours PRN Temp greater or equal to 38C (100.4F)  dextrose 40% Gel 15 Gram(s) Oral once PRN Blood Glucose LESS THAN 70 milliGRAM(s)/deciliter  glucagon  Injectable 1 milliGRAM(s) IntraMuscular once PRN Glucose LESS THAN 70 milligrams/deciliter      Vital Signs Last 24 Hrs  T(C): 36.7 (22 Mar 2020 05:41), Max: 36.8 (21 Mar 2020 22:15)  T(F): 98 (22 Mar 2020 05:41), Max: 98.3 (21 Mar 2020 22:15)  HR: 89 (22 Mar 2020 05:41) (89 - 97)  BP: 90/57 (22 Mar 2020 05:41) (90/57 - 107/65)  BP(mean): --  RR: 18 (22 Mar 2020 05:41) (18 - 18)  SpO2: 97% (22 Mar 2020 05:41) (96% - 97%)    CAPILLARY BLOOD GLUCOSE      POCT Blood Glucose.: 117 mg/dL (22 Mar 2020 08:24)  POCT Blood Glucose.: 174 mg/dL (21 Mar 2020 22:18)  POCT Blood Glucose.: 148 mg/dL (21 Mar 2020 17:09)  POCT Blood Glucose.: 188 mg/dL (21 Mar 2020 12:32)        PHYSICAL EXAM:  GENERAL: NAD, well-developed  HEAD:  Atraumatic, Normocephalic  EYES: EOMI, PERRLA, conjunctiva and sclera clear  NECK: Supple, No JVD  CHEST/LUNG: Clear to auscultation bilaterally; No wheeze  HEART: Regular rate and rhythm; No murmurs, rubs, or gallops  ABDOMEN: Soft, Nontender, Nondistended; Bowel sounds present  EXTREMITIES:  2+ Peripheral Pulses, No clubbing, cyanosis, or edema  PSYCH: awake  NEUROLOGY: non-focal  SKIN: No rashes or lesions    LABS:                                   13.1   3.76  )-----------( 167      ( 22 Mar 2020 06:49 )             38.8   03-22    136  |  103  |  13  ----------------------------<  129<H>  4.1   |  23  |  0.89    Ca    8.1<L>      22 Mar 2020 06:49  Phos  2.8     03-22  Mg     2.0     03-22        RADIOLOGY & ADDITIONAL TESTS:     Imaging Personally Reviewed:CXR: clear lungs     Consultant(s) Notes Reviewed:      Care Discussed with Consultants/Other Providers:    Assessment and Plan:

## 2020-03-23 LAB
ANION GAP SERPL CALC-SCNC: 11 MMO/L — SIGNIFICANT CHANGE UP (ref 7–14)
BUN SERPL-MCNC: 15 MG/DL — SIGNIFICANT CHANGE UP (ref 7–23)
CALCIUM SERPL-MCNC: 7.9 MG/DL — LOW (ref 8.4–10.5)
CHLORIDE SERPL-SCNC: 102 MMOL/L — SIGNIFICANT CHANGE UP (ref 98–107)
CO2 SERPL-SCNC: 22 MMOL/L — SIGNIFICANT CHANGE UP (ref 22–31)
CREAT SERPL-MCNC: 0.81 MG/DL — SIGNIFICANT CHANGE UP (ref 0.5–1.3)
GLUCOSE SERPL-MCNC: 118 MG/DL — HIGH (ref 70–99)
HCT VFR BLD CALC: 37.1 % — SIGNIFICANT CHANGE UP (ref 34.5–45)
HGB BLD-MCNC: 12.3 G/DL — SIGNIFICANT CHANGE UP (ref 11.5–15.5)
MAGNESIUM SERPL-MCNC: 2.1 MG/DL — SIGNIFICANT CHANGE UP (ref 1.6–2.6)
MCHC RBC-ENTMCNC: 30.4 PG — SIGNIFICANT CHANGE UP (ref 27–34)
MCHC RBC-ENTMCNC: 33.2 % — SIGNIFICANT CHANGE UP (ref 32–36)
MCV RBC AUTO: 91.6 FL — SIGNIFICANT CHANGE UP (ref 80–100)
NRBC # FLD: 0 K/UL — SIGNIFICANT CHANGE UP (ref 0–0)
PHOSPHATE SERPL-MCNC: 3.1 MG/DL — SIGNIFICANT CHANGE UP (ref 2.5–4.5)
PLATELET # BLD AUTO: 166 K/UL — SIGNIFICANT CHANGE UP (ref 150–400)
PMV BLD: 9.8 FL — SIGNIFICANT CHANGE UP (ref 7–13)
POTASSIUM SERPL-MCNC: 4.8 MMOL/L — SIGNIFICANT CHANGE UP (ref 3.5–5.3)
POTASSIUM SERPL-SCNC: 4.8 MMOL/L — SIGNIFICANT CHANGE UP (ref 3.5–5.3)
RBC # BLD: 4.05 M/UL — SIGNIFICANT CHANGE UP (ref 3.8–5.2)
RBC # FLD: 13.8 % — SIGNIFICANT CHANGE UP (ref 10.3–14.5)
SODIUM SERPL-SCNC: 135 MMOL/L — SIGNIFICANT CHANGE UP (ref 135–145)
WBC # BLD: 3.86 K/UL — SIGNIFICANT CHANGE UP (ref 3.8–10.5)
WBC # FLD AUTO: 3.86 K/UL — SIGNIFICANT CHANGE UP (ref 3.8–10.5)

## 2020-03-23 PROCEDURE — 99232 SBSQ HOSP IP/OBS MODERATE 35: CPT

## 2020-03-23 PROCEDURE — 71045 X-RAY EXAM CHEST 1 VIEW: CPT | Mod: 26

## 2020-03-23 RX ADMIN — Medication 25 MICROGRAM(S): at 05:14

## 2020-03-23 RX ADMIN — Medication 650 MILLIGRAM(S): at 12:05

## 2020-03-23 RX ADMIN — Medication 1: at 12:46

## 2020-03-23 RX ADMIN — PANTOPRAZOLE SODIUM 40 MILLIGRAM(S): 20 TABLET, DELAYED RELEASE ORAL at 05:15

## 2020-03-23 RX ADMIN — Medication 650 MILLIGRAM(S): at 14:02

## 2020-03-23 RX ADMIN — HEPARIN SODIUM 5000 UNIT(S): 5000 INJECTION INTRAVENOUS; SUBCUTANEOUS at 13:39

## 2020-03-23 RX ADMIN — HEPARIN SODIUM 5000 UNIT(S): 5000 INJECTION INTRAVENOUS; SUBCUTANEOUS at 05:14

## 2020-03-23 NOTE — PROGRESS NOTE ADULT - SUBJECTIVE AND OBJECTIVE BOX
Patient is a 49y old  Female who presents with a chief complaint of Syncope (19 Mar 2020 21:33)    SUBJECTIVE / OVERNIGHT EVENTS: Patient is non verbal and unable to provide history. Appears well. Not in distress. Sleeping comfortable.     MEDICATIONS  (STANDING):  dextrose 5%. 1000 milliLiter(s) (50 mL/Hr) IV Continuous <Continuous>  dextrose 50% Injectable 12.5 Gram(s) IV Push once  dextrose 50% Injectable 25 Gram(s) IV Push once  dextrose 50% Injectable 25 Gram(s) IV Push once  heparin  Injectable 5000 Unit(s) SubCutaneous three times a day  insulin lispro (HumaLOG) corrective regimen sliding scale   SubCutaneous three times a day before meals  insulin lispro (HumaLOG) corrective regimen sliding scale   SubCutaneous at bedtime  levothyroxine 25 MICROGram(s) Oral daily  pantoprazole    Tablet 40 milliGRAM(s) Oral before breakfast    MEDICATIONS  (PRN):  acetaminophen   Tablet .. 650 milliGRAM(s) Oral every 6 hours PRN Temp greater or equal to 38C (100.4F)  dextrose 40% Gel 15 Gram(s) Oral once PRN Blood Glucose LESS THAN 70 milliGRAM(s)/deciliter  glucagon  Injectable 1 milliGRAM(s) IntraMuscular once PRN Glucose LESS THAN 70 milligrams/deciliter    Vital Signs Last 24 Hrs  T(C): 39.4 (23 Mar 2020 11:57), Max: 39.4 (23 Mar 2020 11:57)  T(F): 102.9 (23 Mar 2020 11:57), Max: 102.9 (23 Mar 2020 11:57)  HR: 104 (23 Mar 2020 11:57) (65 - 104)  BP: 111/70 (23 Mar 2020 11:57) (99/58 - 122/72)  BP(mean): --  RR: 18 (23 Mar 2020 11:57) (17 - 18)  SpO2: 96% (23 Mar 2020 11:57) (96% - 99%)    CAPILLARY BLOOD GLUCOSE      POCT Blood Glucose.: 119 mg/dL (23 Mar 2020 08:42)  POCT Blood Glucose.: 150 mg/dL (22 Mar 2020 22:15)  POCT Blood Glucose.: 139 mg/dL (22 Mar 2020 17:08)  POCT Blood Glucose.: 129 mg/dL (22 Mar 2020 12:47)    PHYSICAL EXAM:  GENERAL: NAD, well-developed  HEAD:  Atraumatic, Normocephalic  EYES: EOMI, PERRLA, conjunctiva and sclera clear  NECK: Supple, No JVD  CHEST/LUNG: Clear to auscultation bilaterally; No wheeze  HEART: Regular rate and rhythm; No murmurs, rubs, or gallops  ABDOMEN: Soft, Nontender, Nondistended; Bowel sounds present  EXTREMITIES:  2+ Peripheral Pulses, No clubbing, cyanosis, or edema  PSYCH: awake  NEUROLOGY: non-focal  SKIN: No rashes or lesions    LABS:                                   12.3   3.86  )-----------( 166      ( 23 Mar 2020 02:00 )             37.1   03-23    135  |  102  |  15  ----------------------------<  118<H>  4.8   |  22  |  0.81    Ca    7.9<L>      23 Mar 2020 02:00  Phos  3.1     03-23  Mg     2.1     03-23      RADIOLOGY & ADDITIONAL TESTS:     Imaging Personally Reviewed:CXR: clear lungs     Consultant(s) Notes Reviewed:      Care Discussed with Consultants/Other Providers:    Assessment and Plan:

## 2020-03-23 NOTE — PROGRESS NOTE ADULT - PROBLEM SELECTOR PLAN 1
-History of syncope post BM suggestive of vasovagal episode  -No further episodes here. No tele events  -Orthostatics positive but improved s/p hydration  -likely due to underlying COVID19 infection  - Ambulate w/ assistance  -COVID +

## 2020-03-24 LAB
ANION GAP SERPL CALC-SCNC: 11 MMO/L — SIGNIFICANT CHANGE UP (ref 7–14)
BUN SERPL-MCNC: 15 MG/DL — SIGNIFICANT CHANGE UP (ref 7–23)
CALCIUM SERPL-MCNC: 8 MG/DL — LOW (ref 8.4–10.5)
CHLORIDE SERPL-SCNC: 103 MMOL/L — SIGNIFICANT CHANGE UP (ref 98–107)
CO2 SERPL-SCNC: 22 MMOL/L — SIGNIFICANT CHANGE UP (ref 22–31)
CREAT SERPL-MCNC: 0.86 MG/DL — SIGNIFICANT CHANGE UP (ref 0.5–1.3)
CULTURE RESULTS: SIGNIFICANT CHANGE UP
CULTURE RESULTS: SIGNIFICANT CHANGE UP
GLUCOSE SERPL-MCNC: 128 MG/DL — HIGH (ref 70–99)
HCT VFR BLD CALC: 39.3 % — SIGNIFICANT CHANGE UP (ref 34.5–45)
HGB BLD-MCNC: 12.4 G/DL — SIGNIFICANT CHANGE UP (ref 11.5–15.5)
MAGNESIUM SERPL-MCNC: 2.1 MG/DL — SIGNIFICANT CHANGE UP (ref 1.6–2.6)
MCHC RBC-ENTMCNC: 30 PG — SIGNIFICANT CHANGE UP (ref 27–34)
MCHC RBC-ENTMCNC: 31.6 % — LOW (ref 32–36)
MCV RBC AUTO: 94.9 FL — SIGNIFICANT CHANGE UP (ref 80–100)
NRBC # FLD: 0 K/UL — SIGNIFICANT CHANGE UP (ref 0–0)
PHOSPHATE SERPL-MCNC: 3.3 MG/DL — SIGNIFICANT CHANGE UP (ref 2.5–4.5)
PLATELET # BLD AUTO: 198 K/UL — SIGNIFICANT CHANGE UP (ref 150–400)
PMV BLD: 9.4 FL — SIGNIFICANT CHANGE UP (ref 7–13)
POTASSIUM SERPL-MCNC: 4.2 MMOL/L — SIGNIFICANT CHANGE UP (ref 3.5–5.3)
POTASSIUM SERPL-SCNC: 4.2 MMOL/L — SIGNIFICANT CHANGE UP (ref 3.5–5.3)
RBC # BLD: 4.14 M/UL — SIGNIFICANT CHANGE UP (ref 3.8–5.2)
RBC # FLD: 13.8 % — SIGNIFICANT CHANGE UP (ref 10.3–14.5)
SODIUM SERPL-SCNC: 136 MMOL/L — SIGNIFICANT CHANGE UP (ref 135–145)
SPECIMEN SOURCE: SIGNIFICANT CHANGE UP
SPECIMEN SOURCE: SIGNIFICANT CHANGE UP
WBC # BLD: 3.76 K/UL — LOW (ref 3.8–10.5)
WBC # FLD AUTO: 3.76 K/UL — LOW (ref 3.8–10.5)

## 2020-03-24 PROCEDURE — 99232 SBSQ HOSP IP/OBS MODERATE 35: CPT

## 2020-03-24 RX ADMIN — Medication 650 MILLIGRAM(S): at 12:55

## 2020-03-24 RX ADMIN — HEPARIN SODIUM 5000 UNIT(S): 5000 INJECTION INTRAVENOUS; SUBCUTANEOUS at 04:40

## 2020-03-24 RX ADMIN — Medication 25 MICROGRAM(S): at 04:40

## 2020-03-24 RX ADMIN — HEPARIN SODIUM 5000 UNIT(S): 5000 INJECTION INTRAVENOUS; SUBCUTANEOUS at 15:50

## 2020-03-24 RX ADMIN — Medication 650 MILLIGRAM(S): at 21:13

## 2020-03-24 RX ADMIN — Medication 650 MILLIGRAM(S): at 22:13

## 2020-03-24 RX ADMIN — PANTOPRAZOLE SODIUM 40 MILLIGRAM(S): 20 TABLET, DELAYED RELEASE ORAL at 04:40

## 2020-03-24 NOTE — PROGRESS NOTE ADULT - SUBJECTIVE AND OBJECTIVE BOX
Patient is a 49y old  Female who presents with a chief complaint of Syncope (19 Mar 2020 21:33)    SUBJECTIVE / OVERNIGHT EVENTS: Patient is non verbal and unable to provide history. Appears well. Not in distress. Afebrile overnight.     MEDICATIONS  (STANDING):  dextrose 5%. 1000 milliLiter(s) (50 mL/Hr) IV Continuous <Continuous>  dextrose 50% Injectable 12.5 Gram(s) IV Push once  dextrose 50% Injectable 25 Gram(s) IV Push once  dextrose 50% Injectable 25 Gram(s) IV Push once  heparin  Injectable 5000 Unit(s) SubCutaneous three times a day  insulin lispro (HumaLOG) corrective regimen sliding scale   SubCutaneous three times a day before meals  insulin lispro (HumaLOG) corrective regimen sliding scale   SubCutaneous at bedtime  levothyroxine 25 MICROGram(s) Oral daily  pantoprazole    Tablet 40 milliGRAM(s) Oral before breakfast    MEDICATIONS  (PRN):  acetaminophen   Tablet .. 650 milliGRAM(s) Oral every 6 hours PRN Temp greater or equal to 38C (100.4F)  dextrose 40% Gel 15 Gram(s) Oral once PRN Blood Glucose LESS THAN 70 milliGRAM(s)/deciliter  glucagon  Injectable 1 milliGRAM(s) IntraMuscular once PRN Glucose LESS THAN 70 milligrams/deciliter    Vital Signs Last 24 Hrs  T(C): 37.6 (24 Mar 2020 04:54), Max: 39.4 (23 Mar 2020 11:57)  T(F): 99.7 (24 Mar 2020 04:54), Max: 102.9 (23 Mar 2020 11:57)  HR: 78 (24 Mar 2020 04:54) (78 - 104)  BP: 107/57 (24 Mar 2020 04:54) (104/61 - 111/70)  BP(mean): --  RR: 16 (24 Mar 2020 04:54) (16 - 18)  SpO2: 98% (24 Mar 2020 04:54) (95% - 98%)      PHYSICAL EXAM:  GENERAL: NAD, well-developed  HEAD:  Atraumatic, Normocephalic  EYES: EOMI, PERRLA, conjunctiva and sclera clear  NECK: Supple, No JVD  CHEST/LUNG: Clear to auscultation bilaterally; No wheeze  HEART: Regular rate and rhythm; No murmurs, rubs, or gallops  ABDOMEN: Soft, Nontender, Nondistended; Bowel sounds present  EXTREMITIES:  2+ Peripheral Pulses, No clubbing, cyanosis, or edema  PSYCH: awake  NEUROLOGY: non-focal  SKIN: No rashes or lesions    LABS:                                   12.4   3.76  )-----------( 198      ( 24 Mar 2020 06:00 )             39.3   03-24    136  |  103  |  15  ----------------------------<  128<H>  4.2   |  22  |  0.86    Ca    8.0<L>      24 Mar 2020 06:00  Phos  3.3     03-24  Mg     2.1     03-24               RADIOLOGY & ADDITIONAL TESTS:     Imaging Personally Reviewed:CXR: clear lungs     Consultant(s) Notes Reviewed:      Care Discussed with Consultants/Other Providers:    Assessment and Plan:

## 2020-03-25 PROCEDURE — 93010 ELECTROCARDIOGRAM REPORT: CPT

## 2020-03-25 PROCEDURE — 99232 SBSQ HOSP IP/OBS MODERATE 35: CPT

## 2020-03-25 RX ORDER — HYDROXYCHLOROQUINE SULFATE 200 MG
TABLET ORAL
Refills: 0 | Status: DISCONTINUED | OUTPATIENT
Start: 2020-03-25 | End: 2020-03-26

## 2020-03-25 RX ADMIN — Medication 650 MILLIGRAM(S): at 17:31

## 2020-03-25 RX ADMIN — Medication 25 MICROGRAM(S): at 05:08

## 2020-03-25 RX ADMIN — PANTOPRAZOLE SODIUM 40 MILLIGRAM(S): 20 TABLET, DELAYED RELEASE ORAL at 05:08

## 2020-03-25 RX ADMIN — Medication 1: at 18:04

## 2020-03-25 RX ADMIN — HEPARIN SODIUM 5000 UNIT(S): 5000 INJECTION INTRAVENOUS; SUBCUTANEOUS at 05:08

## 2020-03-25 RX ADMIN — HEPARIN SODIUM 5000 UNIT(S): 5000 INJECTION INTRAVENOUS; SUBCUTANEOUS at 14:46

## 2020-03-25 RX ADMIN — Medication 650 MILLIGRAM(S): at 18:52

## 2020-03-25 NOTE — PROGRESS NOTE ADULT - PROBLEM SELECTOR PLAN 1
-febrile on admission, febrile today to 102.9  -repeat blood cx negative and CXR ok, repeat urine cx pending  - COVID-19+ viral syndrome  - Chest Xray: impression: clear lungs; repeat CXR 3/23 stable  - Will hold off on abx for CAP at this time  - Full RVP negative  -Saturating well on RA  - AVOID HFNC/Bipap/Nebulizers  - Contact, airborne, droplet isolation precautions  - Monitor respiratory status closely  -if hypoxic start 02 and hydroxychloroquine.

## 2020-03-25 NOTE — PROVIDER CONTACT NOTE (OTHER) - ACTION/TREATMENT ORDERED:
Pa notified. vs q4h and will cont. to monitor.
tele pa notified. Cold packs provided and will give Tylenol as per orders. Will reassess temp and cont. to monitor.
NC place, acetaminophen given, EKG dude, will continue to monitor as per providers orders.

## 2020-03-25 NOTE — CHART NOTE - NSCHARTNOTEFT_GEN_A_CORE
pt has been prone throughout most of the day.  dropped sat to 80's on RA placed on 3L NC with saturation of 98%.  currently febrile 102.3 will obtain ekg to evaluate qtc and start Plaquenil

## 2020-03-25 NOTE — PROGRESS NOTE ADULT - SUBJECTIVE AND OBJECTIVE BOX
Patient is a 49y old  Female who presents with a chief complaint of Syncope (19 Mar 2020 21:33)    SUBJECTIVE / OVERNIGHT EVENTS: Patient is non verbal and unable to provide history. Appears well. Not in distress. continues to be febrile     MEDICATIONS  (STANDING):  dextrose 5%. 1000 milliLiter(s) (50 mL/Hr) IV Continuous <Continuous>  dextrose 50% Injectable 12.5 Gram(s) IV Push once  dextrose 50% Injectable 25 Gram(s) IV Push once  dextrose 50% Injectable 25 Gram(s) IV Push once  heparin  Injectable 5000 Unit(s) SubCutaneous three times a day  insulin lispro (HumaLOG) corrective regimen sliding scale   SubCutaneous three times a day before meals  insulin lispro (HumaLOG) corrective regimen sliding scale   SubCutaneous at bedtime  levothyroxine 25 MICROGram(s) Oral daily  pantoprazole    Tablet 40 milliGRAM(s) Oral before breakfast    MEDICATIONS  (PRN):  acetaminophen   Tablet .. 650 milliGRAM(s) Oral every 6 hours PRN Temp greater or equal to 38C (100.4F)  dextrose 40% Gel 15 Gram(s) Oral once PRN Blood Glucose LESS THAN 70 milliGRAM(s)/deciliter  glucagon  Injectable 1 milliGRAM(s) IntraMuscular once PRN Glucose LESS THAN 70 milligrams/deciliter    Vital Signs Last 24 Hrs  T(C): 39.1 (25 Mar 2020 11:42), Max: 39.1 (25 Mar 2020 11:42)  T(F): 102.3 (25 Mar 2020 11:42), Max: 102.3 (25 Mar 2020 11:42)  HR: 93 (25 Mar 2020 11:42) (82 - 94)  BP: 94/61 (25 Mar 2020 11:42) (94/61 - 111/65)  BP(mean): --  RR: 18 (25 Mar 2020 11:42) (17 - 18)  SpO2: 93% (25 Mar 2020 11:42) (93% - 97%)      PHYSICAL EXAM:  GENERAL: NAD, well-developed  HEAD:  Atraumatic, Normocephalic  EYES: EOMI, PERRLA, conjunctiva and sclera clear  NECK: Supple, No JVD  CHEST/LUNG: Clear to auscultation bilaterally; No wheeze  HEART: Regular rate and rhythm; No murmurs, rubs, or gallops  ABDOMEN: Soft, Nontender, Nondistended; Bowel sounds present  EXTREMITIES:  2+ Peripheral Pulses, No clubbing, cyanosis, or edema  PSYCH: awake  NEUROLOGY: non-focal  SKIN: No rashes or lesions    LABS:                                   12.4   3.76  )-----------( 198      ( 24 Mar 2020 06:00 )             39.3   03-24    136  |  103  |  15  ----------------------------<  128<H>  4.2   |  22  |  0.86    Ca    8.0<L>      24 Mar 2020 06:00  Phos  3.3     03-24  Mg     2.1     03-24                 RADIOLOGY & ADDITIONAL TESTS:     Imaging Personally Reviewed:CXR: clear lungs     Consultant(s) Notes Reviewed:      Care Discussed with Consultants/Other Providers:    Assessment and Plan:

## 2020-03-26 DIAGNOSIS — B97.21 SARS-ASSOCIATED CORONAVIRUS AS THE CAUSE OF DISEASES CLASSIFIED ELSEWHERE: ICD-10-CM

## 2020-03-26 LAB
-  AMIKACIN: SIGNIFICANT CHANGE UP
-  AMPICILLIN/SULBACTAM: SIGNIFICANT CHANGE UP
-  AMPICILLIN: SIGNIFICANT CHANGE UP
-  AZTREONAM: SIGNIFICANT CHANGE UP
-  CEFAZOLIN: SIGNIFICANT CHANGE UP
-  CEFEPIME: SIGNIFICANT CHANGE UP
-  CEFOXITIN: SIGNIFICANT CHANGE UP
-  CEFTRIAXONE: SIGNIFICANT CHANGE UP
-  CIPROFLOXACIN: SIGNIFICANT CHANGE UP
-  GENTAMICIN: SIGNIFICANT CHANGE UP
-  IMIPENEM: SIGNIFICANT CHANGE UP
-  LEVOFLOXACIN: SIGNIFICANT CHANGE UP
-  MEROPENEM: SIGNIFICANT CHANGE UP
-  NITROFURANTOIN: SIGNIFICANT CHANGE UP
-  PIPERACILLIN/TAZOBACTAM: SIGNIFICANT CHANGE UP
-  TIGECYCLINE: SIGNIFICANT CHANGE UP
-  TOBRAMYCIN: SIGNIFICANT CHANGE UP
-  TRIMETHOPRIM/SULFAMETHOXAZOLE: SIGNIFICANT CHANGE UP
ANION GAP SERPL CALC-SCNC: 12 MMO/L — SIGNIFICANT CHANGE UP (ref 7–14)
BUN SERPL-MCNC: 14 MG/DL — SIGNIFICANT CHANGE UP (ref 7–23)
CALCIUM SERPL-MCNC: 8.4 MG/DL — SIGNIFICANT CHANGE UP (ref 8.4–10.5)
CHLORIDE SERPL-SCNC: 100 MMOL/L — SIGNIFICANT CHANGE UP (ref 98–107)
CO2 SERPL-SCNC: 24 MMOL/L — SIGNIFICANT CHANGE UP (ref 22–31)
CREAT SERPL-MCNC: 0.88 MG/DL — SIGNIFICANT CHANGE UP (ref 0.5–1.3)
CRP SERPL-MCNC: 110.5 MG/L — HIGH
CULTURE RESULTS: SIGNIFICANT CHANGE UP
ERYTHROCYTE [SEDIMENTATION RATE] IN BLOOD: 38 MM/HR — HIGH (ref 4–25)
GLUCOSE SERPL-MCNC: 120 MG/DL — HIGH (ref 70–99)
HCT VFR BLD CALC: 36.3 % — SIGNIFICANT CHANGE UP (ref 34.5–45)
HGB BLD-MCNC: 12.3 G/DL — SIGNIFICANT CHANGE UP (ref 11.5–15.5)
MAGNESIUM SERPL-MCNC: 2.4 MG/DL — SIGNIFICANT CHANGE UP (ref 1.6–2.6)
MCHC RBC-ENTMCNC: 30.8 PG — SIGNIFICANT CHANGE UP (ref 27–34)
MCHC RBC-ENTMCNC: 33.9 % — SIGNIFICANT CHANGE UP (ref 32–36)
MCV RBC AUTO: 90.8 FL — SIGNIFICANT CHANGE UP (ref 80–100)
METHOD TYPE: SIGNIFICANT CHANGE UP
NRBC # FLD: 0 K/UL — SIGNIFICANT CHANGE UP (ref 0–0)
ORGANISM # SPEC MICROSCOPIC CNT: SIGNIFICANT CHANGE UP
ORGANISM # SPEC MICROSCOPIC CNT: SIGNIFICANT CHANGE UP
PHOSPHATE SERPL-MCNC: 2.9 MG/DL — SIGNIFICANT CHANGE UP (ref 2.5–4.5)
PLATELET # BLD AUTO: 269 K/UL — SIGNIFICANT CHANGE UP (ref 150–400)
PMV BLD: 9.3 FL — SIGNIFICANT CHANGE UP (ref 7–13)
POTASSIUM SERPL-MCNC: 4 MMOL/L — SIGNIFICANT CHANGE UP (ref 3.5–5.3)
POTASSIUM SERPL-SCNC: 4 MMOL/L — SIGNIFICANT CHANGE UP (ref 3.5–5.3)
RBC # BLD: 4 M/UL — SIGNIFICANT CHANGE UP (ref 3.8–5.2)
RBC # FLD: 14.1 % — SIGNIFICANT CHANGE UP (ref 10.3–14.5)
SODIUM SERPL-SCNC: 136 MMOL/L — SIGNIFICANT CHANGE UP (ref 135–145)
SPECIMEN SOURCE: SIGNIFICANT CHANGE UP
WBC # BLD: 5.99 K/UL — SIGNIFICANT CHANGE UP (ref 3.8–10.5)
WBC # FLD AUTO: 5.99 K/UL — SIGNIFICANT CHANGE UP (ref 3.8–10.5)

## 2020-03-26 PROCEDURE — 99233 SBSQ HOSP IP/OBS HIGH 50: CPT

## 2020-03-26 RX ORDER — HYDROXYCHLOROQUINE SULFATE 200 MG
200 TABLET ORAL EVERY 12 HOURS
Refills: 0 | Status: DISCONTINUED | OUTPATIENT
Start: 2020-03-26 | End: 2020-03-26

## 2020-03-26 RX ORDER — HYDROXYCHLOROQUINE SULFATE 200 MG
400 TABLET ORAL EVERY 12 HOURS
Refills: 0 | Status: DISCONTINUED | OUTPATIENT
Start: 2020-03-25 | End: 2020-03-26

## 2020-03-26 RX ORDER — CEFTRIAXONE 500 MG/1
1000 INJECTION, POWDER, FOR SOLUTION INTRAMUSCULAR; INTRAVENOUS EVERY 24 HOURS
Refills: 0 | Status: DISCONTINUED | OUTPATIENT
Start: 2020-03-26 | End: 2020-03-29

## 2020-03-26 RX ORDER — HYDROXYCHLOROQUINE SULFATE 200 MG
400 TABLET ORAL EVERY 12 HOURS
Refills: 0 | Status: COMPLETED | OUTPATIENT
Start: 2020-03-26 | End: 2020-03-26

## 2020-03-26 RX ORDER — HYDROXYCHLOROQUINE SULFATE 200 MG
TABLET ORAL
Refills: 0 | Status: COMPLETED | OUTPATIENT
Start: 2020-03-26 | End: 2020-03-30

## 2020-03-26 RX ORDER — HYDROXYCHLOROQUINE SULFATE 200 MG
200 TABLET ORAL EVERY 12 HOURS
Refills: 0 | Status: COMPLETED | OUTPATIENT
Start: 2020-03-27 | End: 2020-03-30

## 2020-03-26 RX ADMIN — Medication 25 MICROGRAM(S): at 06:15

## 2020-03-26 RX ADMIN — CEFTRIAXONE 100 MILLIGRAM(S): 500 INJECTION, POWDER, FOR SOLUTION INTRAMUSCULAR; INTRAVENOUS at 11:01

## 2020-03-26 RX ADMIN — Medication 400 MILLIGRAM(S): at 22:04

## 2020-03-26 RX ADMIN — Medication 400 MILLIGRAM(S): at 11:06

## 2020-03-26 RX ADMIN — HEPARIN SODIUM 5000 UNIT(S): 5000 INJECTION INTRAVENOUS; SUBCUTANEOUS at 06:16

## 2020-03-26 RX ADMIN — HEPARIN SODIUM 5000 UNIT(S): 5000 INJECTION INTRAVENOUS; SUBCUTANEOUS at 15:22

## 2020-03-26 RX ADMIN — PANTOPRAZOLE SODIUM 40 MILLIGRAM(S): 20 TABLET, DELAYED RELEASE ORAL at 06:15

## 2020-03-26 NOTE — PROGRESS NOTE ADULT - PROBLEM SELECTOR PLAN 1
-febrile on admission, continues to spike low grade fevers; Tmax 100.2 overnight  -repeat blood cx negative and CXR ok, repeat urine cx with gram negative rods  - COVID-19+ viral syndrome  - Chest Xray: impression: clear lungs; repeat CXR 3/23 stable  - Full RVP negative  -Requiring supplemental 02 hence started on hydroxychloroquine  - AVOID HFNC/Bipap/Nebulizers  - Contact, airborne, droplet isolation precautions  - Monitor respiratory status closely

## 2020-03-26 NOTE — PROGRESS NOTE ADULT - SUBJECTIVE AND OBJECTIVE BOX
Patient is a 49y old  Female who presents with a chief complaint of Syncope (19 Mar 2020 21:33)    SUBJECTIVE / OVERNIGHT EVENTS: Patient is non verbal and unable to provide history. Appears well. Not in distress. continues to be febrile tmax 100.2    MEDICATIONS  (STANDING):  dextrose 5%. 1000 milliLiter(s) (50 mL/Hr) IV Continuous <Continuous>  dextrose 50% Injectable 12.5 Gram(s) IV Push once  dextrose 50% Injectable 25 Gram(s) IV Push once  dextrose 50% Injectable 25 Gram(s) IV Push once  heparin  Injectable 5000 Unit(s) SubCutaneous three times a day  insulin lispro (HumaLOG) corrective regimen sliding scale   SubCutaneous three times a day before meals  insulin lispro (HumaLOG) corrective regimen sliding scale   SubCutaneous at bedtime  levothyroxine 25 MICROGram(s) Oral daily  pantoprazole    Tablet 40 milliGRAM(s) Oral before breakfast    MEDICATIONS  (PRN):  acetaminophen   Tablet .. 650 milliGRAM(s) Oral every 6 hours PRN Temp greater or equal to 38C (100.4F)  dextrose 40% Gel 15 Gram(s) Oral once PRN Blood Glucose LESS THAN 70 milliGRAM(s)/deciliter  glucagon  Injectable 1 milliGRAM(s) IntraMuscular once PRN Glucose LESS THAN 70 milligrams/deciliter    Vital Signs Last 24 Hrs  T(C): 37.6 (26 Mar 2020 11:28), Max: 39.1 (25 Mar 2020 17:28)  T(F): 99.7 (26 Mar 2020 11:28), Max: 102.3 (25 Mar 2020 17:28)  HR: 96 (26 Mar 2020 11:28) (84 - 96)  BP: 109/61 (26 Mar 2020 11:28) (91/56 - 109/61)  BP(mean): --  RR: 18 (26 Mar 2020 11:28) (17 - 18)  SpO2: 95% (26 Mar 2020 11:28) (80% - 100%)      PHYSICAL EXAM:  GENERAL: NAD, well-developed  HEAD:  Atraumatic, Normocephalic  EYES: EOMI, PERRLA, conjunctiva and sclera clear  NECK: Supple, No JVD  CHEST/LUNG: Clear to auscultation bilaterally; No wheeze  HEART: Regular rate and rhythm; No murmurs, rubs, or gallops  ABDOMEN: Soft, Nontender, Nondistended; Bowel sounds present  EXTREMITIES:  2+ Peripheral Pulses, No clubbing, cyanosis, or edema  PSYCH: awake  NEUROLOGY: non-focal  SKIN: No rashes or lesions    LABS:                                   12.3   5.99  )-----------( 269      ( 26 Mar 2020 09:03 )             36.3   03-26    136  |  100  |  14  ----------------------------<  120<H>  4.0   |  24  |  0.88    Ca    8.4      26 Mar 2020 09:03  Phos  2.9     03-26  Mg     2.4     03-26    Culture - Urine (03.24.20 @ 18:07)    Specimen Source: .Urine Clean Catch (Midstream)    Culture Results:   >100,000 CFU/ml Gram Negative Rods               RADIOLOGY & ADDITIONAL TESTS:     Imaging Personally Reviewed:CXR: clear lungs     Consultant(s) Notes Reviewed:      Care Discussed with Consultants/Other Providers:    Assessment and Plan:

## 2020-03-27 LAB
ANION GAP SERPL CALC-SCNC: 13 MMO/L — SIGNIFICANT CHANGE UP (ref 7–14)
BUN SERPL-MCNC: 14 MG/DL — SIGNIFICANT CHANGE UP (ref 7–23)
CALCIUM SERPL-MCNC: 8.4 MG/DL — SIGNIFICANT CHANGE UP (ref 8.4–10.5)
CHLORIDE SERPL-SCNC: 104 MMOL/L — SIGNIFICANT CHANGE UP (ref 98–107)
CO2 SERPL-SCNC: 23 MMOL/L — SIGNIFICANT CHANGE UP (ref 22–31)
CREAT SERPL-MCNC: 0.91 MG/DL — SIGNIFICANT CHANGE UP (ref 0.5–1.3)
GLUCOSE SERPL-MCNC: 114 MG/DL — HIGH (ref 70–99)
HBV SURFACE AG SER-ACNC: NEGATIVE — SIGNIFICANT CHANGE UP
HCT VFR BLD CALC: 37.2 % — SIGNIFICANT CHANGE UP (ref 34.5–45)
HGB BLD-MCNC: 12.5 G/DL — SIGNIFICANT CHANGE UP (ref 11.5–15.5)
MAGNESIUM SERPL-MCNC: 2.4 MG/DL — SIGNIFICANT CHANGE UP (ref 1.6–2.6)
MCHC RBC-ENTMCNC: 30.8 PG — SIGNIFICANT CHANGE UP (ref 27–34)
MCHC RBC-ENTMCNC: 33.6 % — SIGNIFICANT CHANGE UP (ref 32–36)
MCV RBC AUTO: 91.6 FL — SIGNIFICANT CHANGE UP (ref 80–100)
NRBC # FLD: 0 K/UL — SIGNIFICANT CHANGE UP (ref 0–0)
PHOSPHATE SERPL-MCNC: 3.6 MG/DL — SIGNIFICANT CHANGE UP (ref 2.5–4.5)
PLATELET # BLD AUTO: 304 K/UL — SIGNIFICANT CHANGE UP (ref 150–400)
PMV BLD: 9.1 FL — SIGNIFICANT CHANGE UP (ref 7–13)
POTASSIUM SERPL-MCNC: 4.3 MMOL/L — SIGNIFICANT CHANGE UP (ref 3.5–5.3)
POTASSIUM SERPL-SCNC: 4.3 MMOL/L — SIGNIFICANT CHANGE UP (ref 3.5–5.3)
RBC # BLD: 4.06 M/UL — SIGNIFICANT CHANGE UP (ref 3.8–5.2)
RBC # FLD: 14 % — SIGNIFICANT CHANGE UP (ref 10.3–14.5)
SODIUM SERPL-SCNC: 140 MMOL/L — SIGNIFICANT CHANGE UP (ref 135–145)
T4 FREE SERPL-MCNC: 1.18 NG/DL — SIGNIFICANT CHANGE UP (ref 0.9–1.8)
WBC # BLD: 5.86 K/UL — SIGNIFICANT CHANGE UP (ref 3.8–10.5)
WBC # FLD AUTO: 5.86 K/UL — SIGNIFICANT CHANGE UP (ref 3.8–10.5)

## 2020-03-27 PROCEDURE — 99232 SBSQ HOSP IP/OBS MODERATE 35: CPT

## 2020-03-27 RX ADMIN — HEPARIN SODIUM 5000 UNIT(S): 5000 INJECTION INTRAVENOUS; SUBCUTANEOUS at 21:32

## 2020-03-27 RX ADMIN — Medication 25 MICROGRAM(S): at 04:46

## 2020-03-27 RX ADMIN — HEPARIN SODIUM 5000 UNIT(S): 5000 INJECTION INTRAVENOUS; SUBCUTANEOUS at 12:51

## 2020-03-27 RX ADMIN — Medication 1: at 12:51

## 2020-03-27 RX ADMIN — PANTOPRAZOLE SODIUM 40 MILLIGRAM(S): 20 TABLET, DELAYED RELEASE ORAL at 04:46

## 2020-03-27 RX ADMIN — CEFTRIAXONE 100 MILLIGRAM(S): 500 INJECTION, POWDER, FOR SOLUTION INTRAMUSCULAR; INTRAVENOUS at 09:00

## 2020-03-27 RX ADMIN — Medication 1: at 17:22

## 2020-03-27 RX ADMIN — Medication 200 MILLIGRAM(S): at 21:32

## 2020-03-27 RX ADMIN — Medication 200 MILLIGRAM(S): at 09:00

## 2020-03-27 NOTE — CHART NOTE - NSCHARTNOTEFT_GEN_A_CORE
Bedside RN got needle stick while trying to draw pt's blood.  Per hospital protocol for Occupational exposure, will need to obtain  Hepatitis Panel and HIV testing.   Pt has Down Syndrome, unable to give consent.   Multiple attempts made to contact pt's group home, care coordinator Merline [phone] and   pt's sister Kisha Trujillo. However, unable to reach anyone who can make decisions for pt. Listed home # of [phone] had no answer, no ability to leave   message.   On attempt to reach sister at [phone], a gentleman answered saying no one by that   name resides at that phone number.  Situation and inability to obtain consent d/w Senior  for [facility],   Arias Strickland.   Decision made to proceed with recommended blood work for Occupational Exposure   without consent.

## 2020-03-27 NOTE — DIETITIAN INITIAL EVALUATION ADULT. - PERTINENT MEDS FT
MEDICATIONS  (STANDING):  cefTRIAXone   IVPB 1000 milliGRAM(s) IV Intermittent every 24 hours  dextrose 5%. 1000 milliLiter(s) (50 mL/Hr) IV Continuous <Continuous>  dextrose 50% Injectable 12.5 Gram(s) IV Push once  dextrose 50% Injectable 25 Gram(s) IV Push once  dextrose 50% Injectable 25 Gram(s) IV Push once  heparin  Injectable 5000 Unit(s) SubCutaneous three times a day  hydroxychloroquine   Oral   hydroxychloroquine 200 milliGRAM(s) Oral every 12 hours  insulin lispro (HumaLOG) corrective regimen sliding scale   SubCutaneous three times a day before meals  insulin lispro (HumaLOG) corrective regimen sliding scale   SubCutaneous at bedtime  levothyroxine 25 MICROGram(s) Oral daily  pantoprazole    Tablet 40 milliGRAM(s) Oral before breakfast

## 2020-03-27 NOTE — PROGRESS NOTE ADULT - PROBLEM SELECTOR PLAN 1
-febrile on admission, continues to spike low grade fevers; afebrile overnight however  -repeat blood cx negative and CXR ok, repeat urine cx with gram negative rods  - COVID-19+ viral syndrome  - Chest Xray: impression: clear lungs; repeat CXR 3/23 stable  - Full RVP negative  -Requiring supplemental 02 hence started on hydroxychloroquine  - AVOID HFNC/Bipap/Nebulizers  - Contact, airborne, droplet isolation precautions  - Monitor respiratory status closely

## 2020-03-27 NOTE — DIETITIAN INITIAL EVALUATION ADULT. - OTHER INFO
50 y/o female with Down Syndrome admitted with dx of SARS and COVID-19+. Unable to conduct a face to face interview or nutrition-focused physical exam due to limited contact restrictions related to pt's medical condition and isolation precautions. Attempted to reach out to pt's caretaker however the call went to voicemail. Pt is nonverbal as per [facility]. Spoke with RN who said that pt has been eating fairly during shift. No GI issues noted at the present time. Pt has h/o DM and Glu/POCT labs are elevated; recommend changing diet to Consistent Carbohydrate. In view of fair oral intake, suggest Glucerna Therapeutic Nutrition Shake 240mls 2x daily (440kcals, 20g protein). RDN services to remain available.

## 2020-03-27 NOTE — DIETITIAN INITIAL EVALUATION ADULT. - ADD RECOMMEND
1). Suggest changing diet to Consistent Carbohydrate. 2). Recommend Glucerna Therapeutic Nutrition Shake 240mls 2x daily (440kcals, 20g protein). 3). Encourage and assist with meals. 4). Monitor weights, labs, BM's, skin integrity, p.o. intake. 5). Follow pt as per protocol.

## 2020-03-27 NOTE — PROGRESS NOTE ADULT - SUBJECTIVE AND OBJECTIVE BOX
Patient is a 49y old  Female who presents with a chief complaint of Syncope (19 Mar 2020 21:33)    SUBJECTIVE / OVERNIGHT EVENTS: Patient is non verbal and unable to provide history. Appears well. Not in distress. Afebrile overnight.     MEDICATIONS  (STANDING):  dextrose 5%. 1000 milliLiter(s) (50 mL/Hr) IV Continuous <Continuous>  dextrose 50% Injectable 12.5 Gram(s) IV Push once  dextrose 50% Injectable 25 Gram(s) IV Push once  dextrose 50% Injectable 25 Gram(s) IV Push once  heparin  Injectable 5000 Unit(s) SubCutaneous three times a day  insulin lispro (HumaLOG) corrective regimen sliding scale   SubCutaneous three times a day before meals  insulin lispro (HumaLOG) corrective regimen sliding scale   SubCutaneous at bedtime  levothyroxine 25 MICROGram(s) Oral daily  pantoprazole    Tablet 40 milliGRAM(s) Oral before breakfast    MEDICATIONS  (PRN):  acetaminophen   Tablet .. 650 milliGRAM(s) Oral every 6 hours PRN Temp greater or equal to 38C (100.4F)  dextrose 40% Gel 15 Gram(s) Oral once PRN Blood Glucose LESS THAN 70 milliGRAM(s)/deciliter  glucagon  Injectable 1 milliGRAM(s) IntraMuscular once PRN Glucose LESS THAN 70 milligrams/deciliter    Vital Signs Last 24 Hrs  T(C): 37.6 (27 Mar 2020 11:47), Max: 37.6 (27 Mar 2020 11:47)  T(F): 99.6 (27 Mar 2020 11:47), Max: 99.6 (27 Mar 2020 11:47)  HR: 94 (27 Mar 2020 11:47) (90 - 94)  BP: 105/44 (27 Mar 2020 11:47) (101/69 - 105/65)  BP(mean): --  RR: 18 (27 Mar 2020 11:47) (18 - 18)  SpO2: 94% (27 Mar 2020 11:47) (94% - 96%)      PHYSICAL EXAM:  GENERAL: NAD, well-developed  HEAD:  Atraumatic, Normocephalic  EYES: EOMI, PERRLA, conjunctiva and sclera clear  NECK: Supple, No JVD  CHEST/LUNG: Clear to auscultation bilaterally; No wheeze  HEART: Regular rate and rhythm; No murmurs, rubs, or gallops  ABDOMEN: Soft, Nontender, Nondistended; Bowel sounds present  EXTREMITIES:  2+ Peripheral Pulses, No clubbing, cyanosis, or edema  PSYCH: awake  NEUROLOGY: non-focal  SKIN: No rashes or lesions    LABS:                                   12.5   5.86  )-----------( 304      ( 27 Mar 2020 06:00 )             37.2   03-27    140  |  104  |  14  ----------------------------<  114<H>  4.3   |  23  |  0.91    Ca    8.4      27 Mar 2020 06:00  Phos  3.6     03-27  Mg     2.4     03-27        Culture - Urine (03.24.20 @ 18:07)    Specimen Source: .Urine Clean Catch (Midstream)    Culture Results:   >100,000 CFU/ml Gram Negative Rods               RADIOLOGY & ADDITIONAL TESTS:     Imaging Personally Reviewed:CXR: clear lungs     Consultant(s) Notes Reviewed:      Care Discussed with Consultants/Other Providers:    Assessment and Plan:

## 2020-03-27 NOTE — DIETITIAN INITIAL EVALUATION ADULT. - PERTINENT LABORATORY DATA
03-27 Na 140 mmol/L Glu 114 mg/dL<H> K+ 4.3 mmol/L Cr 0.91 mg/dL BUN 14 mg/dL Phos 3.6 mg/dL  03-20-20 HbA1c 6.7 %  03-27 @ 12:17 POCT 181 mg/dL  03-27 @ 08:39 POCT 114 mg/dL  03-26 @ 21:58 POCT 112 mg/dL  03-26 @ 17:31 POCT 125 mg/dL

## 2020-03-28 LAB
ANION GAP SERPL CALC-SCNC: 13 MMO/L — SIGNIFICANT CHANGE UP (ref 7–14)
BUN SERPL-MCNC: 15 MG/DL — SIGNIFICANT CHANGE UP (ref 7–23)
CALCIUM SERPL-MCNC: 8.5 MG/DL — SIGNIFICANT CHANGE UP (ref 8.4–10.5)
CHLORIDE SERPL-SCNC: 108 MMOL/L — HIGH (ref 98–107)
CO2 SERPL-SCNC: 22 MMOL/L — SIGNIFICANT CHANGE UP (ref 22–31)
CREAT SERPL-MCNC: 0.84 MG/DL — SIGNIFICANT CHANGE UP (ref 0.5–1.3)
CULTURE RESULTS: SIGNIFICANT CHANGE UP
CULTURE RESULTS: SIGNIFICANT CHANGE UP
GLUCOSE SERPL-MCNC: 143 MG/DL — HIGH (ref 70–99)
HBV CORE AB SER-ACNC: NONREACTIVE — SIGNIFICANT CHANGE UP
HBV CORE IGM SER-ACNC: NONREACTIVE — SIGNIFICANT CHANGE UP
HBV SURFACE AB SER-ACNC: REACTIVE — SIGNIFICANT CHANGE UP
HCT VFR BLD CALC: 36.2 % — SIGNIFICANT CHANGE UP (ref 34.5–45)
HCV AB S/CO SERPL IA: 0.12 S/CO — SIGNIFICANT CHANGE UP (ref 0–0.99)
HCV AB SERPL-IMP: SIGNIFICANT CHANGE UP
HGB BLD-MCNC: 12 G/DL — SIGNIFICANT CHANGE UP (ref 11.5–15.5)
MAGNESIUM SERPL-MCNC: 2.6 MG/DL — SIGNIFICANT CHANGE UP (ref 1.6–2.6)
MCHC RBC-ENTMCNC: 30.4 PG — SIGNIFICANT CHANGE UP (ref 27–34)
MCHC RBC-ENTMCNC: 33.1 % — SIGNIFICANT CHANGE UP (ref 32–36)
MCV RBC AUTO: 91.6 FL — SIGNIFICANT CHANGE UP (ref 80–100)
NRBC # FLD: 0.02 K/UL — SIGNIFICANT CHANGE UP (ref 0–0)
PHOSPHATE SERPL-MCNC: 2.8 MG/DL — SIGNIFICANT CHANGE UP (ref 2.5–4.5)
PLATELET # BLD AUTO: 317 K/UL — SIGNIFICANT CHANGE UP (ref 150–400)
PMV BLD: 9 FL — SIGNIFICANT CHANGE UP (ref 7–13)
POTASSIUM SERPL-MCNC: 4.2 MMOL/L — SIGNIFICANT CHANGE UP (ref 3.5–5.3)
POTASSIUM SERPL-SCNC: 4.2 MMOL/L — SIGNIFICANT CHANGE UP (ref 3.5–5.3)
RBC # BLD: 3.95 M/UL — SIGNIFICANT CHANGE UP (ref 3.8–5.2)
RBC # FLD: 14.2 % — SIGNIFICANT CHANGE UP (ref 10.3–14.5)
SODIUM SERPL-SCNC: 143 MMOL/L — SIGNIFICANT CHANGE UP (ref 135–145)
SPECIMEN SOURCE: SIGNIFICANT CHANGE UP
SPECIMEN SOURCE: SIGNIFICANT CHANGE UP
WBC # BLD: 6.25 K/UL — SIGNIFICANT CHANGE UP (ref 3.8–10.5)
WBC # FLD AUTO: 6.25 K/UL — SIGNIFICANT CHANGE UP (ref 3.8–10.5)

## 2020-03-28 PROCEDURE — 99232 SBSQ HOSP IP/OBS MODERATE 35: CPT

## 2020-03-28 RX ADMIN — HEPARIN SODIUM 5000 UNIT(S): 5000 INJECTION INTRAVENOUS; SUBCUTANEOUS at 12:47

## 2020-03-28 RX ADMIN — Medication 200 MILLIGRAM(S): at 09:02

## 2020-03-28 RX ADMIN — HEPARIN SODIUM 5000 UNIT(S): 5000 INJECTION INTRAVENOUS; SUBCUTANEOUS at 05:38

## 2020-03-28 RX ADMIN — Medication 25 MICROGRAM(S): at 05:36

## 2020-03-28 RX ADMIN — Medication 200 MILLIGRAM(S): at 22:14

## 2020-03-28 RX ADMIN — HEPARIN SODIUM 5000 UNIT(S): 5000 INJECTION INTRAVENOUS; SUBCUTANEOUS at 22:14

## 2020-03-28 RX ADMIN — PANTOPRAZOLE SODIUM 40 MILLIGRAM(S): 20 TABLET, DELAYED RELEASE ORAL at 09:02

## 2020-03-28 RX ADMIN — CEFTRIAXONE 100 MILLIGRAM(S): 500 INJECTION, POWDER, FOR SOLUTION INTRAMUSCULAR; INTRAVENOUS at 09:02

## 2020-03-28 NOTE — PROGRESS NOTE ADULT - SUBJECTIVE AND OBJECTIVE BOX
[facility] Division of Hospital Medicine  Ranjith Cobos MD  Pager [phone]    Patient is a 49y old  Female who presents with a chief complaint of Syncope (27 Mar 2020 13:14)      SUBJECTIVE / OVERNIGHT EVENTS: Stable, comfortable      MEDICATIONS  (STANDING):  cefTRIAXone   IVPB 1000 milliGRAM(s) IV Intermittent every 24 hours  dextrose 5%. 1000 milliLiter(s) (50 mL/Hr) IV Continuous <Continuous>  dextrose 50% Injectable 12.5 Gram(s) IV Push once  dextrose 50% Injectable 25 Gram(s) IV Push once  dextrose 50% Injectable 25 Gram(s) IV Push once  heparin  Injectable 5000 Unit(s) SubCutaneous three times a day  hydroxychloroquine   Oral   hydroxychloroquine 200 milliGRAM(s) Oral every 12 hours  insulin lispro (HumaLOG) corrective regimen sliding scale   SubCutaneous three times a day before meals  insulin lispro (HumaLOG) corrective regimen sliding scale   SubCutaneous at bedtime  levothyroxine 25 MICROGram(s) Oral daily  pantoprazole    Tablet 40 milliGRAM(s) Oral before breakfast    MEDICATIONS  (PRN):  acetaminophen   Tablet .. 650 milliGRAM(s) Oral every 6 hours PRN Temp greater or equal to 38C (100.4F)  dextrose 40% Gel 15 Gram(s) Oral once PRN Blood Glucose LESS THAN 70 milliGRAM(s)/deciliter  glucagon  Injectable 1 milliGRAM(s) IntraMuscular once PRN Glucose LESS THAN 70 milligrams/deciliter      CAPILLARY BLOOD GLUCOSE      POCT Blood Glucose.: 121 mg/dL (28 Mar 2020 17:21)  POCT Blood Glucose.: 144 mg/dL (28 Mar 2020 12:45)  POCT Blood Glucose.: 140 mg/dL (28 Mar 2020 09:00)  POCT Blood Glucose.: 157 mg/dL (27 Mar 2020 22:14)    I&O's Summary    27 Mar 2020 07:01  -  28 Mar 2020 07:00  --------------------------------------------------------  IN: 200 mL / OUT: 0 mL / NET: 200 mL    28 Mar 2020 07:01  -  28 Mar 2020 17:44  --------------------------------------------------------  IN: 120 mL / OUT: 0 mL / NET: 120 mL        PHYSICAL EXAM:  Vital Signs Last 24 Hrs  T(C): 37.1 (28 Mar 2020 13:46), Max: 37.1 (28 Mar 2020 05:39)  T(F): 98.8 (28 Mar 2020 13:46), Max: 98.8 (28 Mar 2020 05:39)  HR: 86 (28 Mar 2020 13:46) (86 - 98)  BP: 123/56 (28 Mar 2020 13:46) (105/59 - 123/56)  BP(mean): --  RR: 16 (28 Mar 2020 13:46) (16 - 18)  SpO2: 95% (28 Mar 2020 13:46) (94% - 95%)  CONSTITUTIONAL: NAD  EYES: EOMI, conjunctiva and sclera clear  ENMT: Moist oral mucosa  NECK: Supple  RESPIRATORY: Breathing unlabored  CARDIOVASCULAR: S1S2 no MRG  ABDOMEN: Nondistended  MUSCULOSKELETAL: no clubbing or cyanosis of digits  NEUROLOGY: No focal deficits   SKIN: No rashes or lesions    LABS:                        12.0   6.25  )-----------( 317      ( 28 Mar 2020 07:50 )             36.2     03-28    143  |  108<H>  |  15  ----------------------------<  143<H>  4.2   |  22  |  0.84    Ca    8.5      28 Mar 2020 07:50  Phos  2.8     03-28  Mg     2.6     03-28        CODE: FULL

## 2020-03-28 NOTE — PROGRESS NOTE ADULT - PROBLEM SELECTOR PLAN 1
-febrile on admission, continues to spike low grade fevers; afebrile overnight however  -repeat blood cx negative and CXR ok, repeat urine cx with gram negative rods  - COVID-19+ viral syndrome  - Chest Xray: impression: clear lungs; repeat CXR 3/23 stable  - Full RVP negative  -Required supplemental 02 hence started on hydroxychloroquine--now off supplemental O2, stable for discharge, living situation may prevent isolation from others in home, SW aware  - Contact, airborne, droplet isolation precautions

## 2020-03-29 LAB
HIV-1 VIRAL LOAD RESULT: SIGNIFICANT CHANGE UP
HIV1 RNA # SERPL NAA+PROBE: SIGNIFICANT CHANGE UP
HIV1 RNA SER-IMP: SIGNIFICANT CHANGE UP
HIV1 RNA SERPL NAA+PROBE-ACNC: SIGNIFICANT CHANGE UP
HIV1 RNA SERPL NAA+PROBE-LOG#: SIGNIFICANT CHANGE UP

## 2020-03-29 PROCEDURE — 99232 SBSQ HOSP IP/OBS MODERATE 35: CPT

## 2020-03-29 RX ADMIN — PANTOPRAZOLE SODIUM 40 MILLIGRAM(S): 20 TABLET, DELAYED RELEASE ORAL at 05:09

## 2020-03-29 RX ADMIN — HEPARIN SODIUM 5000 UNIT(S): 5000 INJECTION INTRAVENOUS; SUBCUTANEOUS at 05:09

## 2020-03-29 RX ADMIN — HEPARIN SODIUM 5000 UNIT(S): 5000 INJECTION INTRAVENOUS; SUBCUTANEOUS at 12:42

## 2020-03-29 RX ADMIN — HEPARIN SODIUM 5000 UNIT(S): 5000 INJECTION INTRAVENOUS; SUBCUTANEOUS at 21:46

## 2020-03-29 RX ADMIN — CEFTRIAXONE 100 MILLIGRAM(S): 500 INJECTION, POWDER, FOR SOLUTION INTRAMUSCULAR; INTRAVENOUS at 09:37

## 2020-03-29 RX ADMIN — Medication 200 MILLIGRAM(S): at 21:46

## 2020-03-29 RX ADMIN — Medication 200 MILLIGRAM(S): at 09:37

## 2020-03-29 RX ADMIN — Medication 25 MICROGRAM(S): at 05:09

## 2020-03-29 NOTE — PROGRESS NOTE ADULT - SUBJECTIVE AND OBJECTIVE BOX
[facility] Division of Hospital Medicine  Ranjith Cobos MD  Pager [phone]    Patient is a 49y old  Female who presents with a chief complaint of Syncope (28 Mar 2020 17:44)      SUBJECTIVE / OVERNIGHT EVENTS: No clinical change      MEDICATIONS  (STANDING):  cefTRIAXone   IVPB 1000 milliGRAM(s) IV Intermittent every 24 hours  dextrose 5%. 1000 milliLiter(s) (50 mL/Hr) IV Continuous <Continuous>  dextrose 50% Injectable 12.5 Gram(s) IV Push once  dextrose 50% Injectable 25 Gram(s) IV Push once  dextrose 50% Injectable 25 Gram(s) IV Push once  heparin  Injectable 5000 Unit(s) SubCutaneous three times a day  hydroxychloroquine   Oral   hydroxychloroquine 200 milliGRAM(s) Oral every 12 hours  insulin lispro (HumaLOG) corrective regimen sliding scale   SubCutaneous three times a day before meals  insulin lispro (HumaLOG) corrective regimen sliding scale   SubCutaneous at bedtime  levothyroxine 25 MICROGram(s) Oral daily  pantoprazole    Tablet 40 milliGRAM(s) Oral before breakfast    MEDICATIONS  (PRN):  acetaminophen   Tablet .. 650 milliGRAM(s) Oral every 6 hours PRN Temp greater or equal to 38C (100.4F)  dextrose 40% Gel 15 Gram(s) Oral once PRN Blood Glucose LESS THAN 70 milliGRAM(s)/deciliter  glucagon  Injectable 1 milliGRAM(s) IntraMuscular once PRN Glucose LESS THAN 70 milligrams/deciliter      CAPILLARY BLOOD GLUCOSE      POCT Blood Glucose.: 122 mg/dL (29 Mar 2020 12:24)  POCT Blood Glucose.: 127 mg/dL (29 Mar 2020 08:54)  POCT Blood Glucose.: 121 mg/dL (28 Mar 2020 22:38)  POCT Blood Glucose.: 121 mg/dL (28 Mar 2020 17:21)    I&O's Summary    28 Mar 2020 07:01  -  29 Mar 2020 07:00  --------------------------------------------------------  IN: 120 mL / OUT: 0 mL / NET: 120 mL        PHYSICAL EXAM:  Vital Signs Last 24 Hrs  T(C): 36.7 (29 Mar 2020 11:39), Max: 37.8 (28 Mar 2020 20:21)  T(F): 98.1 (29 Mar 2020 11:39), Max: 100 (28 Mar 2020 20:21)  HR: 83 (29 Mar 2020 11:39) (78 - 83)  BP: 103/61 (29 Mar 2020 11:39) (93/55 - 103/61)  BP(mean): --  RR: 18 (29 Mar 2020 11:39) (16 - 18)  SpO2: 96% (29 Mar 2020 11:39) (94% - 98%)  CONSTITUTIONAL: NAD  EYES: EOMI, conjunctiva and sclera clear  ENMT: Moist oral mucosa  NECK: Supple  RESPIRATORY: Breathing unlabored  CARDIOVASCULAR: not auscultated  ABDOMEN: Nondistended  MUSCULOSKELETAL: no clubbing or cyanosis of digits  NEUROLOGY: No focal deficits   SKIN: No rashes or lesions    LABS:                        12.0   6.25  )-----------( 317      ( 28 Mar 2020 07:50 )             36.2     03-28    143  |  108<H>  |  15  ----------------------------<  143<H>  4.2   |  22  |  0.84    Ca    8.5      28 Mar 2020 07:50  Phos  2.8     03-28  Mg     2.6     03-28            CODE: FULL

## 2020-03-30 ENCOUNTER — TRANSCRIPTION ENCOUNTER (OUTPATIENT)
Age: 50
End: 2020-03-30

## 2020-03-30 DIAGNOSIS — B34.2 CORONAVIRUS INFECTION, UNSPECIFIED: ICD-10-CM

## 2020-03-30 DIAGNOSIS — I10 ESSENTIAL (PRIMARY) HYPERTENSION: ICD-10-CM

## 2020-03-30 DIAGNOSIS — E11.69 TYPE 2 DIABETES MELLITUS WITH OTHER SPECIFIED COMPLICATION: ICD-10-CM

## 2020-03-30 LAB
ANION GAP SERPL CALC-SCNC: 13 MMO/L — SIGNIFICANT CHANGE UP (ref 7–14)
BUN SERPL-MCNC: 16 MG/DL — SIGNIFICANT CHANGE UP (ref 7–23)
CALCIUM SERPL-MCNC: 9 MG/DL — SIGNIFICANT CHANGE UP (ref 8.4–10.5)
CHLORIDE SERPL-SCNC: 105 MMOL/L — SIGNIFICANT CHANGE UP (ref 98–107)
CO2 SERPL-SCNC: 24 MMOL/L — SIGNIFICANT CHANGE UP (ref 22–31)
CREAT SERPL-MCNC: 0.86 MG/DL — SIGNIFICANT CHANGE UP (ref 0.5–1.3)
GLUCOSE SERPL-MCNC: 142 MG/DL — HIGH (ref 70–99)
HCT VFR BLD CALC: 36.1 % — SIGNIFICANT CHANGE UP (ref 34.5–45)
HCV RNA SERPL NAA DL=5-ACNC: NOT DETECTED IU/ML — SIGNIFICANT CHANGE UP
HCV RNA SPEC NAA+PROBE-LOG IU: SIGNIFICANT CHANGE UP
HGB BLD-MCNC: 12 G/DL — SIGNIFICANT CHANGE UP (ref 11.5–15.5)
MAGNESIUM SERPL-MCNC: 2.5 MG/DL — SIGNIFICANT CHANGE UP (ref 1.6–2.6)
MCHC RBC-ENTMCNC: 29.8 PG — SIGNIFICANT CHANGE UP (ref 27–34)
MCHC RBC-ENTMCNC: 33.2 % — SIGNIFICANT CHANGE UP (ref 32–36)
MCV RBC AUTO: 89.6 FL — SIGNIFICANT CHANGE UP (ref 80–100)
NRBC # FLD: 0 K/UL — SIGNIFICANT CHANGE UP (ref 0–0)
PHOSPHATE SERPL-MCNC: 3.3 MG/DL — SIGNIFICANT CHANGE UP (ref 2.5–4.5)
PLATELET # BLD AUTO: 365 K/UL — SIGNIFICANT CHANGE UP (ref 150–400)
PMV BLD: 8.9 FL — SIGNIFICANT CHANGE UP (ref 7–13)
POTASSIUM SERPL-MCNC: 4.1 MMOL/L — SIGNIFICANT CHANGE UP (ref 3.5–5.3)
POTASSIUM SERPL-SCNC: 4.1 MMOL/L — SIGNIFICANT CHANGE UP (ref 3.5–5.3)
RBC # BLD: 4.03 M/UL — SIGNIFICANT CHANGE UP (ref 3.8–5.2)
RBC # FLD: 13.6 % — SIGNIFICANT CHANGE UP (ref 10.3–14.5)
SODIUM SERPL-SCNC: 142 MMOL/L — SIGNIFICANT CHANGE UP (ref 135–145)
WBC # BLD: 6.07 K/UL — SIGNIFICANT CHANGE UP (ref 3.8–10.5)
WBC # FLD AUTO: 6.07 K/UL — SIGNIFICANT CHANGE UP (ref 3.8–10.5)

## 2020-03-30 PROCEDURE — 99232 SBSQ HOSP IP/OBS MODERATE 35: CPT

## 2020-03-30 RX ORDER — ACETAMINOPHEN 500 MG
1 TABLET ORAL
Qty: 42 | Refills: 0
Start: 2020-03-30 | End: 2020-04-12

## 2020-03-30 RX ADMIN — HEPARIN SODIUM 5000 UNIT(S): 5000 INJECTION INTRAVENOUS; SUBCUTANEOUS at 20:27

## 2020-03-30 RX ADMIN — Medication 200 MILLIGRAM(S): at 08:33

## 2020-03-30 RX ADMIN — Medication 25 MICROGRAM(S): at 05:16

## 2020-03-30 RX ADMIN — HEPARIN SODIUM 5000 UNIT(S): 5000 INJECTION INTRAVENOUS; SUBCUTANEOUS at 11:35

## 2020-03-30 RX ADMIN — Medication 200 MILLIGRAM(S): at 20:27

## 2020-03-30 RX ADMIN — HEPARIN SODIUM 5000 UNIT(S): 5000 INJECTION INTRAVENOUS; SUBCUTANEOUS at 05:16

## 2020-03-30 RX ADMIN — PANTOPRAZOLE SODIUM 40 MILLIGRAM(S): 20 TABLET, DELAYED RELEASE ORAL at 05:16

## 2020-03-30 NOTE — DISCHARGE NOTE PROVIDER - PROVIDER TOKENS
FREE:[LAST:[Followup with oyur PCP],PHONE:[(   )    -],FAX:[(   )    -],FOLLOWUP:[1 week],ESTABLISHEDPATIENT:[T]]

## 2020-03-30 NOTE — DISCHARGE NOTE PROVIDER - NSDCCPCAREPLAN_GEN_ALL_CORE_FT
PRINCIPAL DISCHARGE DIAGNOSIS  Diagnosis: Infection due to 2019 novel coronavirus  Assessment and Plan of Treatment: You were admitted for syncopal episode, and found to be positive for COVID-19, requiring oxygenation supplementation. Return to ER if worsening shortness of breath.      SECONDARY DISCHARGE DIAGNOSES  Diagnosis: Syncope  Assessment and Plan of Treatment: You presented to the hospital for a syncopal episoded. No further events occured inpatient. Return to ER if new or worsening symptoms.    Diagnosis: Viral syndrome  Assessment and Plan of Treatment: PRINCIPAL DISCHARGE DIAGNOSIS  Diagnosis: Infection due to 2019 novel coronavirus  Assessment and Plan of Treatment: You have been diagnosed with the COVID-19 virus during your hospital stay. You must self quarantine to complete a 14 day time period.  Monitor for fevers, shortness of breath and cough primarily.  Monitor your temperature daily to not any changes and increases.    It has been determined that you no longer need hospitalization and can recover while remaining in self-quarantine at home. You should follow the prevention steps below until a healthcare provider or local or state health department says you can return to your normal activities.  1. You should restrict activities outside your home, except for getting medical care.  2. Do not go to work, school, or public areas.  3. Avoid using public transportation, ride-sharing, or taxis.  4. Separate yourself from other people and animals in your home.  5. Call ahead before visiting your doctor.  6. Wear a facemask.  7. Cover your coughs and sneezes.  8. Clean your hands often.  9. Avoid sharing personal household items.  10. Clean all “high-touch” surfaces everyday.  11. Monitor your symptoms.  If you have a medical emergency and need to call 911, notify the dispatch personnel that you have COVID-19 If possible, put on a facemask before emergency medical services arrive.  12. Stopping home isolation.  Patients with confirmed COVID-19 should remain under home isolation precautions for 14 days since the positive COVID-19 test and until the risk of secondary transmission to others is thought to be low. The decision to discontinue home isolation precautions should be made on a case-by-case basis, in consultation with healthcare providers and state and local health departments. Your [facility] Department of Health can be reached at [phone] for further information about COVID-19.      SECONDARY DISCHARGE DIAGNOSES  Diagnosis: Syncope  Assessment and Plan of Treatment: You presented to the hospital for a syncopal episoded. No further events occured inpatient. Return to ER if new or worsening symptoms.    Diagnosis: Viral syndrome  Assessment and Plan of Treatment: PRINCIPAL DISCHARGE DIAGNOSIS  Diagnosis: Infection due to 2019 novel coronavirus  Assessment and Plan of Treatment: You have been diagnosed with the COVID-19 virus during your hospital stay.  Monitor for fevers, shortness of breath and cough primarily.  Monitor your temperature daily to not any changes and increases.    It has been determined that you no longer need hospitalization You should follow the prevention steps below until a healthcare provider or local or state health department says you can return to your normal activities.  1. You should restrict activities outside your home, except for getting medical care.  2. Do not go to work, school, or public areas.  3. Avoid using public transportation, ride-sharing, or taxis.  4. Separate yourself from other people and animals in your home.  5. Call ahead before visiting your doctor.  6. Wear a facemask.  7. Cover your coughs and sneezes.  8. Clean your hands often.  9. Avoid sharing personal household items.  10. Clean all “high-touch” surfaces everyday.  11. Monitor your symptoms.  If you have a medical emergency and need to call 911, notify the dispatch personnel that you have COVID-19 If possible, put on a facemask before emergency medical services arrive.  12. Stopping home isolation.  Patients with confirmed COVID-19 should be careful until the risk of secondary transmission to others is thought to be low. The decision to discontinue home isolation precautions should be made on a case-by-case basis, in consultation with healthcare providers and state and local health departments. Your [facility] Department of Health can be reached at [phone] for further information about COVID-19.      SECONDARY DISCHARGE DIAGNOSES  Diagnosis: Syncope  Assessment and Plan of Treatment: You presented to the hospital for a syncopal episoded. No further events occured inpatient. Return to ER if new or worsening symptoms.    Diagnosis: Viral syndrome  Assessment and Plan of Treatment:

## 2020-03-30 NOTE — DISCHARGE NOTE PROVIDER - NSDCFUADDINST_GEN_ALL_CORE_FT
1. Restrict activities outside your home except obtaining medical care  2. Do not go to work, school or public areas.  3. Separate yourself from other members of your household.  4. Call ahead before seeing you doctor, wear a face mask.  5. Clean your hands often.  6. Monitor your symptoms, return to ER if you are experiencing worsening shortness of breath or difficulty breathing.  7. You should remain on home isolation until you have no fever for three full days without  the use of fever-reducing medicine AND at  least seven days have passed since symptoms  first appeared. For more information, call our  coronavirus specialists at [phone]. 1. Restrict activities outside your home except obtaining medical care  2. Do not go to work, school or public areas.  3. Call ahead before seeing you doctor, wear a face mask.  4. Clean your hands often.  5. Monitor your symptoms, return to ER if you are experiencing worsening shortness of breath or difficulty breathing.  6.  For more information, call our  coronavirus specialists at [phone].

## 2020-03-30 NOTE — DISCHARGE NOTE PROVIDER - HOSPITAL COURSE
48 y/o Female w/ PMHx of Down's Syndrome, HTN, hyperlipidemia, Hypothyroidism, DM2 ( on metformin) presented with Syncope x2. Patient is nonverbal and caretaker is provided all history. Per caretaker post bowel movement, pt passed out multiple times, no seizure activity noted by caretaker and no head trauma.  Pt is nonverbal. She was admitted for syncopal workup, found to be febrile and swabbed for COVID-19. 3/19 Pt was +COVID-19. 3/25 patient desatted to 80 on Room air and was started on 3L Nasal Cannula. 3/26 Patient was found to have +Ecoli UTI and received Ceftriaxone for 3 days. 3/26 Patient was started on Plaquenil. Pt has been on room air from 3/27 with oxygenation saturation in 90s. Pt was monitored on telemetry, no further syncopal episodes. Orthostatics initially positive but improved after hydration.             ****Last updated 3/30/20 48 y/o Female w/ PMHx of Down's Syndrome, HTN, hyperlipidemia, Hypothyroidism, DM2 ( on metformin) presented with Syncope x2. Patient is nonverbal and caretaker is provided all history. Per caretaker post bowel movement, pt passed out multiple times, no seizure activity noted by caretaker and no head trauma.  Pt is nonverbal. She was admitted for syncopal workup, found to be febrile and swabbed for COVID-19. 3/19 Pt was +COVID-19. 3/25 patient desatted to 80 on Room air and was started on 3L Nasal Cannula. 3/26 Patient was found to have +Ecoli UTI and received Ceftriaxone for 3 days. 3/26 Patient was started on Plaquenil. Pt has been on room air from 3/27 with oxygenation saturation in 90s. Pt was monitored on telemetry, no further syncopal episodes. Orthostatics initially positive but improved after hydration.     Will continue current medications and discuss with  for discharge.    4/3- As per attending, patient stable for discharge.

## 2020-03-30 NOTE — PROGRESS NOTE ADULT - PROBLEM SELECTOR PLAN 1
-Patient is doing well on room air  -Continue supportive care off oxygen  -Finish course of Plaquenil

## 2020-03-30 NOTE — PROGRESS NOTE ADULT - SUBJECTIVE AND OBJECTIVE BOX
[facility] Division of Hospital Medicine Progress Note    Patient is a 49y old  Female who presents with a chief complaint of Syncope (30 Mar 2020 09:52)      SUBJECTIVE / OVERNIGHT EVENTS: No overnight events. Patient is non-verbal.  ADDITIONAL REVIEW OF SYSTEMS: Patient is non-verbal.    MEDICATIONS  (STANDING):  dextrose 5%. 1000 milliLiter(s) (50 mL/Hr) IV Continuous <Continuous>  dextrose 50% Injectable 12.5 Gram(s) IV Push once  dextrose 50% Injectable 25 Gram(s) IV Push once  dextrose 50% Injectable 25 Gram(s) IV Push once  heparin  Injectable 5000 Unit(s) SubCutaneous three times a day  hydroxychloroquine   Oral   hydroxychloroquine 200 milliGRAM(s) Oral every 12 hours  insulin lispro (HumaLOG) corrective regimen sliding scale   SubCutaneous three times a day before meals  insulin lispro (HumaLOG) corrective regimen sliding scale   SubCutaneous at bedtime  levothyroxine 25 MICROGram(s) Oral daily  pantoprazole    Tablet 40 milliGRAM(s) Oral before breakfast    MEDICATIONS  (PRN):  acetaminophen   Tablet .. 650 milliGRAM(s) Oral every 6 hours PRN Temp greater or equal to 38C (100.4F)  dextrose 40% Gel 15 Gram(s) Oral once PRN Blood Glucose LESS THAN 70 milliGRAM(s)/deciliter  glucagon  Injectable 1 milliGRAM(s) IntraMuscular once PRN Glucose LESS THAN 70 milligrams/deciliter      CAPILLARY BLOOD GLUCOSE      POCT Blood Glucose.: 148 mg/dL (30 Mar 2020 11:34)  POCT Blood Glucose.: 138 mg/dL (30 Mar 2020 08:29)  POCT Blood Glucose.: 135 mg/dL (29 Mar 2020 22:29)  POCT Blood Glucose.: 108 mg/dL (29 Mar 2020 17:19)    I&O's Summary    30 Mar 2020 07:01  -  30 Mar 2020 14:59  --------------------------------------------------------  IN: 60 mL / OUT: 0 mL / NET: 60 mL        PHYSICAL EXAM:  Vital Signs Last 24 Hrs  T(C): 37 (30 Mar 2020 13:11), Max: 37 (30 Mar 2020 13:11)  T(F): 98.6 (30 Mar 2020 13:11), Max: 98.6 (30 Mar 2020 13:11)  HR: 78 (30 Mar 2020 13:11) (75 - 81)  BP: 101/62 (30 Mar 2020 13:11) (95/47 - 103/76)  BP(mean): --  RR: 17 (30 Mar 2020 13:11) (16 - 17)  SpO2: 100% (30 Mar 2020 13:11) (96% - 100%)    CONSTITUTIONAL: NAD, well-developed, well-groomed  ENMT: Moist oral mucosa, no pharyngeal injection or exudates; normal dentition  RESPIRATORY: Normal respiratory effort; lungs are clear to auscultation bilaterally  CARDIOVASCULAR: Regular rate and rhythm, normal S1 and S2, no murmur/rub/gallop; No lower extremity edema; Peripheral pulses are 2+ bilaterally  ABDOMEN: Nontender to palpation, normoactive bowel sounds, no rebound/guarding; No hepatosplenomegaly  PSYCH: A+O to person, place, and time; affect appropriate  NEUROLOGY: A+O x 0 at baseline  SKIN: No rashes; no palpable lesions    LABS:                        12.0   6.07  )-----------( 365      ( 30 Mar 2020 07:32 )             36.1     03-30    142  |  105  |  16  ----------------------------<  142<H>  4.1   |  24  |  0.86    Ca    9.0      30 Mar 2020 07:32  Phos  3.3     03-30  Mg     2.5     03-30                COVID-19 PCR: Detected (03-19-20 @ 21:06)      RADIOLOGY & ADDITIONAL TESTS:  Imaging from Last 24 Hours:  Electrocardiogram/QTc Interval:    COORDINATION OF CARE:  Care Discussed with Consultants/Other Providers:

## 2020-03-30 NOTE — DISCHARGE NOTE PROVIDER - CARE PROVIDER_API CALL
Followup with caleb PCP,   Phone: (   )    -  Fax: (   )    -  Established Patient  Follow Up Time: 1 week

## 2020-03-30 NOTE — DISCHARGE NOTE PROVIDER - NSDCMRMEDTOKEN_GEN_ALL_CORE_FT
acetaminophen 500 mg oral tablet: 1-2  tab(s) orally every 8 hours, As Needed   levothyroxine 25 mcg (0.025 mg) oral tablet: 1 tab(s) orally once a day  metFORMIN 500 mg oral tablet: 1 tab(s) orally 2 times a day  omeprazole 20 mg oral delayed release capsule: 1 cap(s) orally once a day

## 2020-03-31 PROCEDURE — 99232 SBSQ HOSP IP/OBS MODERATE 35: CPT

## 2020-03-31 RX ADMIN — PANTOPRAZOLE SODIUM 40 MILLIGRAM(S): 20 TABLET, DELAYED RELEASE ORAL at 04:43

## 2020-03-31 RX ADMIN — HEPARIN SODIUM 5000 UNIT(S): 5000 INJECTION INTRAVENOUS; SUBCUTANEOUS at 04:43

## 2020-03-31 RX ADMIN — HEPARIN SODIUM 5000 UNIT(S): 5000 INJECTION INTRAVENOUS; SUBCUTANEOUS at 12:29

## 2020-03-31 RX ADMIN — Medication 25 MICROGRAM(S): at 04:43

## 2020-03-31 RX ADMIN — HEPARIN SODIUM 5000 UNIT(S): 5000 INJECTION INTRAVENOUS; SUBCUTANEOUS at 21:17

## 2020-03-31 NOTE — PROGRESS NOTE ADULT - SUBJECTIVE AND OBJECTIVE BOX
[facility] Division of Hospital Medicine Progress Note    Patient is a 49y old  Female who presents with a chief complaint of Syncope (30 Mar 2020 14:59)      SUBJECTIVE / OVERNIGHT EVENTS: No overnight events. No hypoxia. Patient is non-verbal  ADDITIONAL REVIEW OF SYSTEMS: Unable to obtain as patient is non-verbal.    MEDICATIONS  (STANDING):  dextrose 5%. 1000 milliLiter(s) (50 mL/Hr) IV Continuous <Continuous>  dextrose 50% Injectable 12.5 Gram(s) IV Push once  dextrose 50% Injectable 25 Gram(s) IV Push once  dextrose 50% Injectable 25 Gram(s) IV Push once  heparin  Injectable 5000 Unit(s) SubCutaneous three times a day  insulin lispro (HumaLOG) corrective regimen sliding scale   SubCutaneous three times a day before meals  insulin lispro (HumaLOG) corrective regimen sliding scale   SubCutaneous at bedtime  levothyroxine 25 MICROGram(s) Oral daily  pantoprazole    Tablet 40 milliGRAM(s) Oral before breakfast    MEDICATIONS  (PRN):  acetaminophen   Tablet .. 650 milliGRAM(s) Oral every 6 hours PRN Temp greater or equal to 38C (100.4F)  dextrose 40% Gel 15 Gram(s) Oral once PRN Blood Glucose LESS THAN 70 milliGRAM(s)/deciliter  glucagon  Injectable 1 milliGRAM(s) IntraMuscular once PRN Glucose LESS THAN 70 milligrams/deciliter      CAPILLARY BLOOD GLUCOSE      POCT Blood Glucose.: 108 mg/dL (31 Mar 2020 11:49)  POCT Blood Glucose.: 106 mg/dL (31 Mar 2020 08:41)  POCT Blood Glucose.: 112 mg/dL (30 Mar 2020 21:52)  POCT Blood Glucose.: 110 mg/dL (30 Mar 2020 17:03)    I&O's Summary    30 Mar 2020 07:01  -  31 Mar 2020 07:00  --------------------------------------------------------  IN: 60 mL / OUT: 0 mL / NET: 60 mL        PHYSICAL EXAM:  Vital Signs Last 24 Hrs  T(C): 37 (31 Mar 2020 11:22), Max: 37.1 (30 Mar 2020 20:22)  T(F): 98.6 (31 Mar 2020 11:22), Max: 98.7 (30 Mar 2020 20:22)  HR: 84 (31 Mar 2020 11:22) (67 - 87)  BP: 104/64 (31 Mar 2020 11:22) (96/61 - 104/64)  BP(mean): --  RR: 16 (31 Mar 2020 11:22) (16 - 18)  SpO2: 99% (31 Mar 2020 11:22) (98% - 99%)    CONSTITUTIONAL: NAD, well-developed, well-groomed  ENMT: Moist oral mucosa, no pharyngeal injection or exudates; normal dentition  RESPIRATORY: Normal respiratory effort; lungs are clear to auscultation bilaterally  CARDIOVASCULAR: Regular rate and rhythm, normal S1 and S2, no murmur/rub/gallop; No lower extremity edema; Peripheral pulses are 2+ bilaterally  ABDOMEN: Nontender to palpation, normoactive bowel sounds, no rebound/guarding; No hepatosplenomegaly  PSYCH: A+O x 0  NEUROLOGY: UNable to participate in full exam; moves all 4 extremities  SKIN: No rashes; no palpable lesions    LABS:                        12.0   6.07  )-----------( 365      ( 30 Mar 2020 07:32 )             36.1     03-30    142  |  105  |  16  ----------------------------<  142<H>  4.1   |  24  |  0.86    Ca    9.0      30 Mar 2020 07:32  Phos  3.3     03-30  Mg     2.5     03-30                COVID-19 PCR: Detected (03-19-20 @ 21:06)      RADIOLOGY & ADDITIONAL TESTS:  Imaging from Last 24 Hours:  Electrocardiogram/QTc Interval:    COORDINATION OF CARE:  Care Discussed with Consultants/Other Providers:

## 2020-04-01 PROCEDURE — 99232 SBSQ HOSP IP/OBS MODERATE 35: CPT | Mod: CS

## 2020-04-01 RX ADMIN — Medication 25 MICROGRAM(S): at 05:32

## 2020-04-01 RX ADMIN — PANTOPRAZOLE SODIUM 40 MILLIGRAM(S): 20 TABLET, DELAYED RELEASE ORAL at 05:32

## 2020-04-01 RX ADMIN — HEPARIN SODIUM 5000 UNIT(S): 5000 INJECTION INTRAVENOUS; SUBCUTANEOUS at 21:50

## 2020-04-01 RX ADMIN — HEPARIN SODIUM 5000 UNIT(S): 5000 INJECTION INTRAVENOUS; SUBCUTANEOUS at 05:32

## 2020-04-01 RX ADMIN — HEPARIN SODIUM 5000 UNIT(S): 5000 INJECTION INTRAVENOUS; SUBCUTANEOUS at 12:39

## 2020-04-01 NOTE — PROGRESS NOTE ADULT - SUBJECTIVE AND OBJECTIVE BOX
Patient is a 49y old  Female who presents with a chief complaint of Syncope (31 Mar 2020 15:01)    SUBJECTIVE / OVERNIGHT EVENTS: no acute event  ADDITIONAL REVIEW OF SYSTEMS: cardiopulmoanry status unchanged, no nausea, vomiting or abdominal pain    MEDICATIONS  (STANDING):  dextrose 5%. 1000 milliLiter(s) (50 mL/Hr) IV Continuous <Continuous>  dextrose 50% Injectable 12.5 Gram(s) IV Push once  dextrose 50% Injectable 25 Gram(s) IV Push once  dextrose 50% Injectable 25 Gram(s) IV Push once  heparin  Injectable 5000 Unit(s) SubCutaneous three times a day  insulin lispro (HumaLOG) corrective regimen sliding scale   SubCutaneous three times a day before meals  insulin lispro (HumaLOG) corrective regimen sliding scale   SubCutaneous at bedtime  levothyroxine 25 MICROGram(s) Oral daily  pantoprazole    Tablet 40 milliGRAM(s) Oral before breakfast    MEDICATIONS  (PRN):  acetaminophen   Tablet .. 650 milliGRAM(s) Oral every 6 hours PRN Temp greater or equal to 38C (100.4F)  dextrose 40% Gel 15 Gram(s) Oral once PRN Blood Glucose LESS THAN 70 milliGRAM(s)/deciliter  glucagon  Injectable 1 milliGRAM(s) IntraMuscular once PRN Glucose LESS THAN 70 milligrams/deciliter      CAPILLARY BLOOD GLUCOSE      POCT Blood Glucose.: 123 mg/dL (01 Apr 2020 17:00)  POCT Blood Glucose.: 115 mg/dL (01 Apr 2020 12:32)  POCT Blood Glucose.: 111 mg/dL (01 Apr 2020 08:28)  POCT Blood Glucose.: 99 mg/dL (31 Mar 2020 21:45)    I&O's Summary    31 Mar 2020 07:01  -  01 Apr 2020 07:00  --------------------------------------------------------  IN: 200 mL / OUT: 0 mL / NET: 200 mL        PHYSICAL EXAM:  Vital Signs Last 24 Hrs  T(C): 36.7 (01 Apr 2020 17:19), Max: 36.8 (31 Mar 2020 19:18)  T(F): 98 (01 Apr 2020 17:19), Max: 98.2 (31 Mar 2020 19:18)  HR: 89 (01 Apr 2020 17:19) (69 - 89)  BP: 110/70 (01 Apr 2020 17:19) (101/63 - 110/70)  BP(mean): --  RR: 16 (01 Apr 2020 17:19) (16 - 18)  SpO2: 100% (01 Apr 2020 17:19) (97% - 100%)  CONSTITUTIONAL: NAD,  RESPIRATORY: Normal respiratory effort; lungs are clear to auscultation bilaterally  CARDIOVASCULAR: Regular rate and rhythm, normal S1 and S2,   ABDOMEN: Nontender to palpation, normoactive bowel sounds, no rebound/guarding; No hepatosplenomegaly    LABS:                    COVID-19 PCR: Detected (03-19-20 @ 21:06)      RADIOLOGY & ADDITIONAL TESTS:  Imaging from Last 24 Hours:  Electrocardiogram/QTc Interval:    COORDINATION OF CARE:  Care Discussed with Consultants/Other Providers:

## 2020-04-01 NOTE — PROGRESS NOTE ADULT - PROBLEM SELECTOR PLAN 1
-Patient is doing well on room air  -Continue supportive care off oxygen  -Patient is finished with Plaquenil  -Her last temperature was 100 on late 3/28; Per [facility] guidelines and per my discussion with infectious disease, she can come off isolation as a symptomatic individual after 72 hours of no fever and 7 days since symptoms first appeared. She should come off tomorrow. Will d/c back to group home then.

## 2020-04-01 NOTE — PROGRESS NOTE ADULT - SUBJECTIVE AND OBJECTIVE BOX
[facility] Division of Hospital Medicine Progress Note    Patient is a 49y old  Female who presents with a chief complaint of Syncope (30 Mar 2020 14:59)      SUBJECTIVE / OVERNIGHT EVENTS: No overnight events. No hypoxia. Patient is non-verbal  ADDITIONAL REVIEW OF SYSTEMS: Unable to obtain as patient is non-verbal.    MEDICATIONS  (STANDING):  dextrose 5%. 1000 milliLiter(s) (50 mL/Hr) IV Continuous <Continuous>  dextrose 50% Injectable 12.5 Gram(s) IV Push once  dextrose 50% Injectable 25 Gram(s) IV Push once  dextrose 50% Injectable 25 Gram(s) IV Push once  heparin  Injectable 5000 Unit(s) SubCutaneous three times a day  insulin lispro (HumaLOG) corrective regimen sliding scale   SubCutaneous three times a day before meals  insulin lispro (HumaLOG) corrective regimen sliding scale   SubCutaneous at bedtime  levothyroxine 25 MICROGram(s) Oral daily  pantoprazole    Tablet 40 milliGRAM(s) Oral before breakfast    MEDICATIONS  (PRN):  acetaminophen   Tablet .. 650 milliGRAM(s) Oral every 6 hours PRN Temp greater or equal to 38C (100.4F)  dextrose 40% Gel 15 Gram(s) Oral once PRN Blood Glucose LESS THAN 70 milliGRAM(s)/deciliter  glucagon  Injectable 1 milliGRAM(s) IntraMuscular once PRN Glucose LESS THAN 70 milligrams/deciliter      CAPILLARY BLOOD GLUCOSE      POCT Blood Glucose.: 108 mg/dL (31 Mar 2020 11:49)  POCT Blood Glucose.: 106 mg/dL (31 Mar 2020 08:41)  POCT Blood Glucose.: 112 mg/dL (30 Mar 2020 21:52)  POCT Blood Glucose.: 110 mg/dL (30 Mar 2020 17:03)    I&O's Summary    30 Mar 2020 07:01  -  31 Mar 2020 07:00  --------------------------------------------------------  IN: 60 mL / OUT: 0 mL / NET: 60 mL        PHYSICAL EXAM:  Vital Signs Last 24 Hrs  T(C): 37 (31 Mar 2020 11:22), Max: 37.1 (30 Mar 2020 20:22)  T(F): 98.6 (31 Mar 2020 11:22), Max: 98.7 (30 Mar 2020 20:22)  HR: 84 (31 Mar 2020 11:22) (67 - 87)  BP: 104/64 (31 Mar 2020 11:22) (96/61 - 104/64)  BP(mean): --  RR: 16 (31 Mar 2020 11:22) (16 - 18)  SpO2: 99% (31 Mar 2020 11:22) (98% - 99%)    CONSTITUTIONAL: NAD, well-developed, well-groomed  ENMT: Moist oral mucosa, no pharyngeal injection or exudates; normal dentition  RESPIRATORY: Normal respiratory effort; lungs are clear to auscultation bilaterally  CARDIOVASCULAR: Regular rate and rhythm, normal S1 and S2, no murmur/rub/gallop; No lower extremity edema; Peripheral pulses are 2+ bilaterally  ABDOMEN: Nontender to palpation, normoactive bowel sounds, no rebound/guarding; No hepatosplenomegaly  PSYCH: A+O x 0  NEUROLOGY: UNable to participate in full exam; moves all 4 extremities  SKIN: No rashes; no palpable lesions    LABS:                        12.0   6.07  )-----------( 365      ( 30 Mar 2020 07:32 )             36.1     03-30    142  |  105  |  16  ----------------------------<  142<H>  4.1   |  24  |  0.86    Ca    9.0      30 Mar 2020 07:32  Phos  3.3     03-30  Mg     2.5     03-30                COVID-19 PCR: Detected (03-19-20 @ 21:06)      RADIOLOGY & ADDITIONAL TESTS:  Imaging from Last 24 Hours:  Electrocardiogram/QTc Interval:    COORDINATION OF CARE:  Care Discussed with Consultants/Other Providers:

## 2020-04-02 PROCEDURE — 99232 SBSQ HOSP IP/OBS MODERATE 35: CPT | Mod: CS

## 2020-04-02 RX ADMIN — Medication 25 MICROGRAM(S): at 04:59

## 2020-04-02 RX ADMIN — HEPARIN SODIUM 5000 UNIT(S): 5000 INJECTION INTRAVENOUS; SUBCUTANEOUS at 12:04

## 2020-04-02 RX ADMIN — HEPARIN SODIUM 5000 UNIT(S): 5000 INJECTION INTRAVENOUS; SUBCUTANEOUS at 22:11

## 2020-04-02 RX ADMIN — PANTOPRAZOLE SODIUM 40 MILLIGRAM(S): 20 TABLET, DELAYED RELEASE ORAL at 04:59

## 2020-04-02 NOTE — PROGRESS NOTE ADULT - SUBJECTIVE AND OBJECTIVE BOX
Patient is a 49y old  Female who presents with a chief complaint of Syncope (01 Apr 2020 17:35)    SUBJECTIVE / OVERNIGHT EVENTS:no acute event  ADDITIONAL REVIEW OF SYSTEMS: cardiopulmoanry status unchanged, no nausea, vomiting or abdominal pain, Saturation of 98% on RA, afebrile    MEDICATIONS  (STANDING):  dextrose 5%. 1000 milliLiter(s) (50 mL/Hr) IV Continuous <Continuous>  dextrose 50% Injectable 12.5 Gram(s) IV Push once  dextrose 50% Injectable 25 Gram(s) IV Push once  dextrose 50% Injectable 25 Gram(s) IV Push once  heparin  Injectable 5000 Unit(s) SubCutaneous three times a day  insulin lispro (HumaLOG) corrective regimen sliding scale   SubCutaneous three times a day before meals  insulin lispro (HumaLOG) corrective regimen sliding scale   SubCutaneous at bedtime  levothyroxine 25 MICROGram(s) Oral daily  pantoprazole    Tablet 40 milliGRAM(s) Oral before breakfast    MEDICATIONS  (PRN):  acetaminophen   Tablet .. 650 milliGRAM(s) Oral every 6 hours PRN Temp greater or equal to 38C (100.4F)  dextrose 40% Gel 15 Gram(s) Oral once PRN Blood Glucose LESS THAN 70 milliGRAM(s)/deciliter  glucagon  Injectable 1 milliGRAM(s) IntraMuscular once PRN Glucose LESS THAN 70 milligrams/deciliter      CAPILLARY BLOOD GLUCOSE      POCT Blood Glucose.: 124 mg/dL (02 Apr 2020 17:02)  POCT Blood Glucose.: 134 mg/dL (02 Apr 2020 11:55)  POCT Blood Glucose.: 127 mg/dL (02 Apr 2020 08:26)  POCT Blood Glucose.: 124 mg/dL (01 Apr 2020 21:40)    I&O's Summary    01 Apr 2020 07:01  -  02 Apr 2020 07:00  --------------------------------------------------------  IN: 200 mL / OUT: 0 mL / NET: 200 mL    02 Apr 2020 07:01  -  02 Apr 2020 20:45  --------------------------------------------------------  IN: 178 mL / OUT: 1 mL / NET: 177 mL        PHYSICAL EXAM:  Vital Signs Last 24 Hrs  T(C): 36.6 (02 Apr 2020 20:33), Max: 36.9 (02 Apr 2020 04:56)  T(F): 97.8 (02 Apr 2020 20:33), Max: 98.5 (02 Apr 2020 04:56)  HR: 83 (02 Apr 2020 20:33) (80 - 95)  BP: 100/57 (02 Apr 2020 20:33) (100/57 - 128/65)  BP(mean): --  RR: 17 (02 Apr 2020 20:33) (17 - 18)  SpO2: 98% (02 Apr 2020 20:33) (98% - 100%)  CONSTITUTIONAL: NAD  RESPIRATORY: Normal respiratory effort;  CARDIOVASCULAR: Regular rate and rhythm, normal S1 and S2,   ABDOMEN: Nontender   SKIN: normal skin turgor        COVID-19 PCR: Detected (03-19-20 @ 21:06)    COORDINATION OF CARE:  Care Discussed with Consultants/Other Providers:

## 2020-04-03 ENCOUNTER — TRANSCRIPTION ENCOUNTER (OUTPATIENT)
Age: 50
End: 2020-04-03

## 2020-04-03 VITALS
HEART RATE: 81 BPM | DIASTOLIC BLOOD PRESSURE: 65 MMHG | RESPIRATION RATE: 18 BRPM | TEMPERATURE: 98 F | OXYGEN SATURATION: 98 % | SYSTOLIC BLOOD PRESSURE: 111 MMHG

## 2020-04-03 PROCEDURE — 99238 HOSP IP/OBS DSCHRG MGMT 30/<: CPT | Mod: CS

## 2020-04-03 RX ADMIN — HEPARIN SODIUM 5000 UNIT(S): 5000 INJECTION INTRAVENOUS; SUBCUTANEOUS at 04:37

## 2020-04-03 RX ADMIN — PANTOPRAZOLE SODIUM 40 MILLIGRAM(S): 20 TABLET, DELAYED RELEASE ORAL at 04:37

## 2020-04-03 RX ADMIN — Medication 25 MICROGRAM(S): at 04:37

## 2020-04-03 NOTE — PROGRESS NOTE ADULT - ASSESSMENT
48 y/o Female w/ PMHx of Down's Syndrome, HTN, hyperlipidemia, Hypothyroidism, DM2 ( on metformin) presented with Syncope x2. Patient found to be COVID positive
48 y/o Female w/ PMHx of Down's Syndrome, HTN, hyperlipidemia, Hypothyroidism, DM2 ( on metformin) presents with Syncope x2. Patient found to be COVID positive
48 y/o Female w/ PMHx of Down's Syndrome, HTN, hyperlipidemia, Hypothyroidism, DM2 ( on metformin) presents with Syncope x2. Patient found to be COVID positive
48 y/o Female w/ PMHx of Down's Syndrome, HTN, hyperlipidemia, Hypothyroidism, DM2 ( on metformin) presents with Syncope x2. Pt is being admitted for syncope work up and r/o COVID 19.
50 y/o Female w/ PMHx of Down's Syndrome, HTN, hyperlipidemia, Hypothyroidism, DM2 ( on metformin) presented with Syncope x2. Patient found to be COVID positive
50 y/o Female w/ PMHx of Down's Syndrome, HTN, hyperlipidemia, Hypothyroidism, DM2 ( on metformin) presented with Syncope x2. Patient found to be COVID positive
50 y/o Female w/ PMHx of Down's Syndrome, HTN, hyperlipidemia, Hypothyroidism, DM2 ( on metformin) presents with Syncope x2. Patient found to be COVID positive
50 y/o Female w/ PMHx of Down's Syndrome, HTN, hyperlipidemia, Hypothyroidism, DM2 ( on metformin) presents with Syncope x2. Patient found to be COVID positive
50 y/o Female w/ PMHx of Down's Syndrome, HTN, hyperlipidemia, Hypothyroidism, DM2 ( on metformin) presents with Syncope x2. Pt is being admitted for syncope work up and r/o COVID 19.
A 49 year-old woman with Down' syndrome, HTN, hyperlipidemia, hypothyroidism, DM presented with syncope, was tested COVID positive, s/p ceftriaxone for UTI, and s/p Plaquenil, off oxygen, and is planned for discharge.   Will continue current medications and discuss with  for discharge
A 49 year-old woman with Down' syndrome, HTN, hyperlipidemia, hypothyroidism, DM presented with syncope, was tested COVID positive, s/p ceftriaxone for UTI, and s/p Plaquenil, off oxygen, and is planning for discharge.   Will continue current medications and discuss with  for discharge.
50 y/o Female w/ PMHx of Down's Syndrome, HTN, hyperlipidemia, Hypothyroidism, DM2 ( on metformin) presented with Syncope x2. Patient found to be COVID positive

## 2020-04-03 NOTE — PROGRESS NOTE ADULT - PROBLEM SELECTOR PLAN 2
-History of syncope post BM suggestive of vasovagal episode  -No further episodes here. No tele events  -Orthostatics positive but improved s/p hydration  -likely due to underlying COVID19 infection  - Ambulate w/ assistance  -COVID +
-No tele events  -Likely due to dehydration as she was orthostatic on admission, now resolved  -Continue PO hydration, supportive care
-febrile on admission  - COVID-19+ viral syndrome  - Chest Xray: impression: clear lungs  - Will hold off on abx for CAP at this time  - Full RVP negative  -Saturating well on RA  - AVOID HFNC/Bipap/Nebulizers  - Contact, airborne, droplet isolation precautions  - Monitor respiratory status closely
-febrile on admission, febrile today to 102.9  - COVID-19+ viral syndrome  - Chest Xray: impression: clear lungs  - Will hold off on abx for CAP at this time  - Full RVP negative  -Saturating well on RA  - AVOID HFNC/Bipap/Nebulizers  - Contact, airborne, droplet isolation precautions  - Monitor respiratory status closely
-febrile on admission, febrile today to 102.9  -repeat blood cx, UA, urine cx  - COVID-19+ viral syndrome  - Chest Xray: impression: clear lungs; repeat CXR 3/23 stable  - Will hold off on abx for CAP at this time  - Full RVP negative  -Saturating well on RA  - AVOID HFNC/Bipap/Nebulizers  - Contact, airborne, droplet isolation precautions  - Monitor respiratory status closely
-initial urine culture unremarkable  -repeat urine culture with gram negative ros  -start ceftriaxone, completed 3 day course.   -EColi CTX sens
-initial urine culture unremarkable  -repeat urine culture with gram negative ros  -start ceftriaxone, completed 3 day course.   -EColi CTX sens
-initial urine culture unremarkable  -repeat urine culture with gram negative ros  -start ceftriaxone, plan for 3 day course. Day 1/3  -f/u sensitivities
-initial urine culture unremarkable  -repeat urine culture with gram negative ros  -start ceftriaxone, plan for 3 day course. Day 2/3  -f/u sensitivities
Pt with unexplained fever of 100.3. Afebrile overnight.   - R/o COVID-19 viral syndrome  - Chest Xray: impression: clear lungs  - Will hold off on abx for CAP at this time  - Full RVP negative  -Saturating well on RA  - AVOID HFNC/Bipap/Nebulizers  - Contact, airborne, droplet isolation precautions  - Monitor respiratory status closely
Pt with unexplained fever of 100.3. Afebrile today.   - R/o COVID-19 viral syndrome  - Chest Xray: impression: clear lungs  - CT chest ordered  - Will hold off on abx for CAP at this time  - Full RVP negative  -Saturating well on RA  - AVOID HFNC/Bipap/Nebulizers  - Contact, airborne, droplet isolation precautions  - Monitor respiratory status closely
-No tele events  -Likely due to dehydration as she was orthostatic on admission, now resolved  -Continue PO hydration, supportive care

## 2020-04-03 NOTE — PROGRESS NOTE ADULT - PROBLEM SELECTOR PROBLEM 3
Hypertension, unspecified type
Syncope, unspecified syncope type
UTI (urinary tract infection)
Hypertension, unspecified type

## 2020-04-03 NOTE — PROGRESS NOTE ADULT - PROBLEM SELECTOR PROBLEM 4
Diabetes mellitus, type 2
Type 2 diabetes mellitus with other specified complication, unspecified whether long term insulin use

## 2020-04-03 NOTE — PROGRESS NOTE ADULT - SUBJECTIVE AND OBJECTIVE BOX
[facility] Division of Hospital Medicine Progress Note    Patient is a 49y old  Female who presents with a chief complaint of Syncope (30 Mar 2020 14:59)      SUBJECTIVE / OVERNIGHT EVENTS: No overnight events. No hypoxia. Patient is non-verbal  ADDITIONAL REVIEW OF SYSTEMS: Unable to obtain as patient is non-verbal.    MEDICATIONS  (STANDING):  dextrose 5%. 1000 milliLiter(s) (50 mL/Hr) IV Continuous <Continuous>  dextrose 50% Injectable 12.5 Gram(s) IV Push once  dextrose 50% Injectable 25 Gram(s) IV Push once  dextrose 50% Injectable 25 Gram(s) IV Push once  heparin  Injectable 5000 Unit(s) SubCutaneous three times a day  insulin lispro (HumaLOG) corrective regimen sliding scale   SubCutaneous three times a day before meals  insulin lispro (HumaLOG) corrective regimen sliding scale   SubCutaneous at bedtime  levothyroxine 25 MICROGram(s) Oral daily  pantoprazole    Tablet 40 milliGRAM(s) Oral before breakfast    MEDICATIONS  (PRN):  acetaminophen   Tablet .. 650 milliGRAM(s) Oral every 6 hours PRN Temp greater or equal to 38C (100.4F)  dextrose 40% Gel 15 Gram(s) Oral once PRN Blood Glucose LESS THAN 70 milliGRAM(s)/deciliter  glucagon  Injectable 1 milliGRAM(s) IntraMuscular once PRN Glucose LESS THAN 70 milligrams/deciliter      CAPILLARY BLOOD GLUCOSE      POCT Blood Glucose.: 108 mg/dL (31 Mar 2020 11:49)  POCT Blood Glucose.: 106 mg/dL (31 Mar 2020 08:41)  POCT Blood Glucose.: 112 mg/dL (30 Mar 2020 21:52)  POCT Blood Glucose.: 110 mg/dL (30 Mar 2020 17:03)    I&O's Summary    30 Mar 2020 07:01  -  31 Mar 2020 07:00  --------------------------------------------------------  IN: 60 mL / OUT: 0 mL / NET: 60 mL        PHYSICAL EXAM:  Vital Signs Last 24 Hrs  T(C): 37 (31 Mar 2020 11:22), Max: 37.1 (30 Mar 2020 20:22)  T(F): 98.6 (31 Mar 2020 11:22), Max: 98.7 (30 Mar 2020 20:22)  HR: 84 (31 Mar 2020 11:22) (67 - 87)  BP: 104/64 (31 Mar 2020 11:22) (96/61 - 104/64)  BP(mean): --  RR: 16 (31 Mar 2020 11:22) (16 - 18)  SpO2: 99% (31 Mar 2020 11:22) (98% - 99%)    CONSTITUTIONAL: NAD, well-developed, well-groomed  ENMT: Moist oral mucosa, no pharyngeal injection or exudates; normal dentition  RESPIRATORY: Normal respiratory effort; lungs are clear to auscultation bilaterally  CARDIOVASCULAR: Regular rate and rhythm, normal S1 and S2, no murmur/rub/gallop; No lower extremity edema; Peripheral pulses are 2+ bilaterally  ABDOMEN: Nontender to palpation, normoactive bowel sounds, no rebound/guarding; No hepatosplenomegaly  PSYCH: A+O x 0  NEUROLOGY: UNable to participate in full exam; moves all 4 extremities  SKIN: No rashes; no palpable lesions    LABS:                        12.0   6.07  )-----------( 365      ( 30 Mar 2020 07:32 )             36.1     03-30    142  |  105  |  16  ----------------------------<  142<H>  4.1   |  24  |  0.86    Ca    9.0      30 Mar 2020 07:32  Phos  3.3     03-30  Mg     2.5     03-30                COVID-19 PCR: Detected (03-19-20 @ 21:06)      RADIOLOGY & ADDITIONAL TESTS:  Imaging from Last 24 Hours:  Electrocardiogram/QTc Interval:    COORDINATION OF CARE:  Care Discussed with Consultants/Other Providers:

## 2020-04-03 NOTE — PROGRESS NOTE ADULT - REASON FOR ADMISSION
Syncope

## 2020-04-03 NOTE — PROGRESS NOTE ADULT - PROBLEM/PLAN-1
DISPLAY PLAN FREE TEXT
Improved

## 2020-04-03 NOTE — PROGRESS NOTE ADULT - PROBLEM SELECTOR PLAN 1
-Patient is doing well on room air  -Continue supportive care off oxygen  -Patient is finished with Plaquenil  -Her last temperature was 100 on late 3/28; Per [facility] guidelines and per my discussion with infectious disease, she can come off isolation as a symptomatic individual after 72 hours of no fever and 7 days since symptoms first appeared.

## 2020-04-03 NOTE — PROGRESS NOTE ADULT - PROBLEM SELECTOR PLAN 5
-SQ Heparin
- Aspiration precautions  - Fall risk protocol
-SQ Heparin

## 2020-04-03 NOTE — PROGRESS NOTE ADULT - PROBLEM SELECTOR PROBLEM 5
DVT prophylaxis
Down syndrome

## 2020-04-03 NOTE — PROGRESS NOTE ADULT - PROBLEM SELECTOR PROBLEM 6
Discharge planning issues
Hypothyroidism

## 2020-04-03 NOTE — PROGRESS NOTE ADULT - PROBLEM SELECTOR PROBLEM 2
Syncope, unspecified syncope type
UTI (urinary tract infection)
Viral syndrome
Syncope, unspecified syncope type

## 2020-04-03 NOTE — DISCHARGE NOTE NURSING/CASE MANAGEMENT/SOCIAL WORK - PATIENT PORTAL LINK FT
You can access the FollowMyHealth Patient Portal offered by [facility] by registering at the following website: [email or web address]. By joining [employer]’s FollowMyHealth portal, you will also be able to view your health information using other applications (apps) compatible with our system.

## 2020-04-03 NOTE — PROGRESS NOTE ADULT - PROBLEM SELECTOR PLAN 3
- Leuk Esterase: MODERATE   - Blood cultures and urine cultures sent  - will hold off on abx for now pending culture results  - monitor
- Leuk Esterase: MODERATE   -cultures unremarkable   - will hold off on abx for now
- Leuk Esterase: MODERATE   -cultures unremarkable   - will hold off on abx for now pending culture results  - monitor
- Leuk Esterase: MODERATE   -cultures unremarkable   - will hold off on abx for now pending culture results  - monitor
-History of syncope post BM suggestive of vasovagal episode  -No further episodes here. No tele events  -Orthostatics positive but improved s/p hydration  -likely due to underlying COVID19 infection  - Ambulate w/ assistance
-History of syncope post BM suggestive of vasovagal episode  -No further episodes here. No tele events  -Orthostatics positive but improved s/p hydration  -likely due to underlying COVID19 infection  - Ambulate w/ assistance  -COVID +
-Hold BP meds as she was orthostatic on admission

## 2020-04-03 NOTE — PROGRESS NOTE ADULT - PROBLEM SELECTOR PLAN 6
-plan to d/c back to group home tomorrow.
- Continue levothyroxine
- Continue levothyroxine  -TSH 5.75  -would not increase dose in acute setting  -repeat testing in 4 weeks
- Continue levothyroxine  -TSH 5.75; free t4 1.18  -would not increase dose in acute setting  -repeat testing in 4 weeks
-Will need to await confirmation from group [facility] on whether patient is ok for return given COVID; but medically, patient is ok for discharge after last dose of Plaquenil today
-plan to d/c back to group home today.
-plan to d/c back to group home tomorrow.

## 2020-04-03 NOTE — PROGRESS NOTE ADULT - PROBLEM SELECTOR PLAN 4
-Finger sticks are ok, monitor on sliding scale
- Hold oral DM meds while inpatient   - A1C 6.7  - monitor FS  - diabetic diet
-Finger sticks are ok, monitor on sliding scale

## 2022-09-01 NOTE — INPATIENT CERTIFICATION FOR MEDICARE PATIENTS - IN ORDER TO MEET MEDICARE REQUIREMENTS.
In order to meet Medicare requirements, the clinical documentation must support the information cited in the admission order.  Please be sure to provide detailed and clear documentation about the following in the admitting note/history and physical: Itraconazole Pregnancy And Lactation Text: This medication is Pregnancy Category C and it isn't know if it is safe during pregnancy. It is also excreted in breast milk.

## 2022-10-24 NOTE — PROGRESS NOTE ADULT - PROBLEM SELECTOR PROBLEM 1
Infection due to 2019 novel coronavirus
SARS-associated coronavirus infection
Syncope, unspecified syncope type
Viral syndrome
Infection due to 2019 novel coronavirus
the patient  is a 23 y female complaining of hematuria and pelvic pain

## 2024-08-22 NOTE — PROVIDER CONTACT NOTE (OTHER) - BACKGROUND
----- Message from Lucina Barker DNP sent at 8/22/2024  8:29 AM CDT -----  Reviewed; EKG stable, however abnormal. Recommend that she see cardiology for further evaluation.   
Covid-19 pt
admitted for Syncope and collapse   NSR/ST on tele  orthostatic positive
admitted for Syncope and collapse   covid positive